# Patient Record
Sex: FEMALE | Race: WHITE | Employment: OTHER | URBAN - METROPOLITAN AREA
[De-identification: names, ages, dates, MRNs, and addresses within clinical notes are randomized per-mention and may not be internally consistent; named-entity substitution may affect disease eponyms.]

---

## 2023-09-23 DIAGNOSIS — N32.81 OAB (OVERACTIVE BLADDER): Primary | ICD-10-CM

## 2023-09-25 PROBLEM — E78.00 HYPERCHOLESTEROLEMIA: Status: ACTIVE | Noted: 2023-09-25

## 2023-09-25 PROBLEM — F32.5 DEPRESSION, MAJOR, IN REMISSION (CMS-HCC): Status: ACTIVE | Noted: 2023-09-25

## 2023-09-25 PROBLEM — N32.81 OAB (OVERACTIVE BLADDER): Status: ACTIVE | Noted: 2023-09-25

## 2023-09-25 PROBLEM — I25.10 CAD (CORONARY ARTERY DISEASE): Status: ACTIVE | Noted: 2023-09-25

## 2023-09-25 RX ORDER — OXYBUTYNIN CHLORIDE 5 MG/1
5 TABLET ORAL 2 TIMES DAILY
Qty: 180 TABLET | Refills: 0 | Status: SHIPPED | OUTPATIENT
Start: 2023-09-25 | End: 2024-02-19

## 2023-10-26 ENCOUNTER — LAB (OUTPATIENT)
Dept: LAB | Facility: LAB | Age: 83
End: 2023-10-26
Payer: MEDICARE

## 2023-10-26 ENCOUNTER — OFFICE VISIT (OUTPATIENT)
Dept: PRIMARY CARE | Facility: CLINIC | Age: 83
End: 2023-10-26
Payer: MEDICARE

## 2023-10-26 VITALS
BODY MASS INDEX: 24.59 KG/M2 | TEMPERATURE: 97.1 F | OXYGEN SATURATION: 95 % | HEART RATE: 77 BPM | WEIGHT: 153 LBS | DIASTOLIC BLOOD PRESSURE: 77 MMHG | SYSTOLIC BLOOD PRESSURE: 139 MMHG | HEIGHT: 66 IN

## 2023-10-26 DIAGNOSIS — F32.5 DEPRESSION, MAJOR, IN REMISSION (CMS-HCC): ICD-10-CM

## 2023-10-26 DIAGNOSIS — E78.00 HYPERCHOLESTEROLEMIA: ICD-10-CM

## 2023-10-26 DIAGNOSIS — Z23 NEED FOR VACCINATION: ICD-10-CM

## 2023-10-26 DIAGNOSIS — I25.10 CORONARY ARTERY DISEASE INVOLVING NATIVE CORONARY ARTERY OF NATIVE HEART WITHOUT ANGINA PECTORIS: ICD-10-CM

## 2023-10-26 DIAGNOSIS — Z00.00 ROUTINE GENERAL MEDICAL EXAMINATION AT HEALTH CARE FACILITY: Primary | ICD-10-CM

## 2023-10-26 PROBLEM — H90.3 SENSORINEURAL HEARING LOSS, BILATERAL: Status: ACTIVE | Noted: 2023-10-26

## 2023-10-26 PROBLEM — I34.1 MITRAL VALVE PROLAPSE: Status: ACTIVE | Noted: 2023-10-26

## 2023-10-26 PROBLEM — H91.90 HEARING LOSS: Status: ACTIVE | Noted: 2023-10-26

## 2023-10-26 PROBLEM — M16.10 HIP ARTHRITIS: Status: ACTIVE | Noted: 2023-10-26

## 2023-10-26 LAB
ALBUMIN SERPL BCP-MCNC: 4.2 G/DL (ref 3.4–5)
ALP SERPL-CCNC: 53 U/L (ref 33–136)
ALT SERPL W P-5'-P-CCNC: 19 U/L (ref 7–45)
ANION GAP SERPL CALC-SCNC: 12 MMOL/L (ref 10–20)
AST SERPL W P-5'-P-CCNC: 21 U/L (ref 9–39)
BILIRUB SERPL-MCNC: 0.9 MG/DL (ref 0–1.2)
BUN SERPL-MCNC: 15 MG/DL (ref 6–23)
CALCIUM SERPL-MCNC: 9.5 MG/DL (ref 8.6–10.3)
CHLORIDE SERPL-SCNC: 103 MMOL/L (ref 98–107)
CHOLEST SERPL-MCNC: 194 MG/DL (ref 0–199)
CHOLESTEROL/HDL RATIO: 2.9
CO2 SERPL-SCNC: 29 MMOL/L (ref 21–32)
CREAT SERPL-MCNC: 0.71 MG/DL (ref 0.5–1.05)
ERYTHROCYTE [DISTWIDTH] IN BLOOD BY AUTOMATED COUNT: 13.2 % (ref 11.5–14.5)
GFR SERPL CREATININE-BSD FRML MDRD: 84 ML/MIN/1.73M*2
GLUCOSE SERPL-MCNC: 102 MG/DL (ref 74–99)
HCT VFR BLD AUTO: 41.5 % (ref 36–46)
HDLC SERPL-MCNC: 67.6 MG/DL
HGB BLD-MCNC: 13.4 G/DL (ref 12–16)
LDLC SERPL CALC-MCNC: 98 MG/DL
MCH RBC QN AUTO: 30.1 PG (ref 26–34)
MCHC RBC AUTO-ENTMCNC: 32.3 G/DL (ref 32–36)
MCV RBC AUTO: 93 FL (ref 80–100)
NON HDL CHOLESTEROL: 126 MG/DL (ref 0–149)
NRBC BLD-RTO: 0 /100 WBCS (ref 0–0)
PLATELET # BLD AUTO: 223 X10*3/UL (ref 150–450)
PMV BLD AUTO: 10.3 FL (ref 7.5–11.5)
POTASSIUM SERPL-SCNC: 4.5 MMOL/L (ref 3.5–5.3)
PROT SERPL-MCNC: 6.2 G/DL (ref 6.4–8.2)
RBC # BLD AUTO: 4.45 X10*6/UL (ref 4–5.2)
SODIUM SERPL-SCNC: 139 MMOL/L (ref 136–145)
TRIGL SERPL-MCNC: 142 MG/DL (ref 0–149)
VLDL: 28 MG/DL (ref 0–40)
WBC # BLD AUTO: 5 X10*3/UL (ref 4.4–11.3)

## 2023-10-26 PROCEDURE — 1170F FXNL STATUS ASSESSED: CPT | Performed by: FAMILY MEDICINE

## 2023-10-26 PROCEDURE — 1036F TOBACCO NON-USER: CPT | Performed by: FAMILY MEDICINE

## 2023-10-26 PROCEDURE — 1160F RVW MEDS BY RX/DR IN RCRD: CPT | Performed by: FAMILY MEDICINE

## 2023-10-26 PROCEDURE — 90662 IIV NO PRSV INCREASED AG IM: CPT | Performed by: FAMILY MEDICINE

## 2023-10-26 PROCEDURE — 36415 COLL VENOUS BLD VENIPUNCTURE: CPT

## 2023-10-26 PROCEDURE — 80053 COMPREHEN METABOLIC PANEL: CPT

## 2023-10-26 PROCEDURE — G0008 ADMIN INFLUENZA VIRUS VAC: HCPCS | Performed by: FAMILY MEDICINE

## 2023-10-26 PROCEDURE — 1126F AMNT PAIN NOTED NONE PRSNT: CPT | Performed by: FAMILY MEDICINE

## 2023-10-26 PROCEDURE — 85027 COMPLETE CBC AUTOMATED: CPT

## 2023-10-26 PROCEDURE — 80061 LIPID PANEL: CPT

## 2023-10-26 PROCEDURE — G0439 PPPS, SUBSEQ VISIT: HCPCS | Performed by: FAMILY MEDICINE

## 2023-10-26 PROCEDURE — 1159F MED LIST DOCD IN RCRD: CPT | Performed by: FAMILY MEDICINE

## 2023-10-26 RX ORDER — MELOXICAM 15 MG/1
1 TABLET ORAL DAILY PRN
COMMUNITY
Start: 2019-05-22

## 2023-10-26 RX ORDER — METOPROLOL SUCCINATE 25 MG/1
1 TABLET, EXTENDED RELEASE ORAL DAILY
COMMUNITY
Start: 2016-05-12 | End: 2024-01-21 | Stop reason: SDUPTHER

## 2023-10-26 RX ORDER — ASPIRIN 81 MG/1
1 TABLET ORAL DAILY
COMMUNITY
Start: 2016-05-12

## 2023-10-26 RX ORDER — FLUOXETINE HYDROCHLORIDE 20 MG/1
1 CAPSULE ORAL DAILY
COMMUNITY
Start: 2021-02-21 | End: 2023-11-01

## 2023-10-26 RX ORDER — AMLODIPINE BESYLATE 5 MG/1
1 TABLET ORAL DAILY
COMMUNITY
Start: 2022-05-03 | End: 2024-04-10

## 2023-10-26 RX ORDER — IRBESARTAN 150 MG/1
1 TABLET ORAL DAILY
COMMUNITY
Start: 2017-06-28 | End: 2024-01-21 | Stop reason: SDUPTHER

## 2023-10-26 RX ORDER — ELECTROLYTES/DEXTROSE
5 SOLUTION, ORAL ORAL DAILY
COMMUNITY
Start: 2022-05-03

## 2023-10-26 RX ORDER — PNV NO.95/FERROUS FUM/FOLIC AC 28MG-0.8MG
1 TABLET ORAL DAILY
COMMUNITY
Start: 2022-10-25

## 2023-10-26 RX ORDER — IBUPROFEN 200 MG
200 TABLET ORAL
COMMUNITY
Start: 2016-05-12

## 2023-10-26 RX ORDER — SIMVASTATIN 20 MG/1
1 TABLET, FILM COATED ORAL EVERY EVENING
COMMUNITY
Start: 2023-05-12 | End: 2024-03-05

## 2023-10-26 SDOH — ECONOMIC STABILITY: FOOD INSECURITY: WITHIN THE PAST 12 MONTHS, YOU WORRIED THAT YOUR FOOD WOULD RUN OUT BEFORE YOU GOT MONEY TO BUY MORE.: NEVER TRUE

## 2023-10-26 SDOH — ECONOMIC STABILITY: FOOD INSECURITY: WITHIN THE PAST 12 MONTHS, THE FOOD YOU BOUGHT JUST DIDN'T LAST AND YOU DIDN'T HAVE MONEY TO GET MORE.: NEVER TRUE

## 2023-10-26 ASSESSMENT — PATIENT HEALTH QUESTIONNAIRE - PHQ9
SUM OF ALL RESPONSES TO PHQ9 QUESTIONS 1 & 2: 0
1. LITTLE INTEREST OR PLEASURE IN DOING THINGS: NOT AT ALL
2. FEELING DOWN, DEPRESSED OR HOPELESS: NOT AT ALL

## 2023-10-26 ASSESSMENT — ACTIVITIES OF DAILY LIVING (ADL)
DOING_HOUSEWORK: INDEPENDENT
GROCERY_SHOPPING: INDEPENDENT
MANAGING_FINANCES: INDEPENDENT
BATHING: INDEPENDENT
DRESSING: INDEPENDENT
TAKING_MEDICATION: INDEPENDENT

## 2023-10-26 ASSESSMENT — LIFESTYLE VARIABLES
HOW MANY STANDARD DRINKS CONTAINING ALCOHOL DO YOU HAVE ON A TYPICAL DAY: 1 OR 2
HOW OFTEN DO YOU HAVE SIX OR MORE DRINKS ON ONE OCCASION: NEVER
HOW OFTEN DO YOU HAVE A DRINK CONTAINING ALCOHOL: MONTHLY OR LESS
SKIP TO QUESTIONS 9-10: 1
AUDIT-C TOTAL SCORE: 1

## 2023-10-26 ASSESSMENT — ENCOUNTER SYMPTOMS
OCCASIONAL FEELINGS OF UNSTEADINESS: 0
LOSS OF SENSATION IN FEET: 0
DEPRESSION: 0

## 2023-10-26 ASSESSMENT — PAIN SCALES - GENERAL: PAINLEVEL: 0-NO PAIN

## 2023-10-26 NOTE — PROGRESS NOTES
"Subjective   Patient ID: Christine Horowitz is a 82 y.o. female who presents for Medicare Annual Wellness Visit Subsequent.  No problems or concerns.         In addition to that documented in the HPI above, the additional ROS was obtained:  Constitutional: Denies fevers or chills  Eyes: Denies vision changes  ENMT: Denies trouble swallowing  Cardiovascular: Denies chest pain or heart racing  Respiratory: Denies SOB or cough  Gastrointestinal: Denies nausea, vomiting, diarrhea or constipation      Current Outpatient Medications   Medication Sig Dispense Refill    amLODIPine (Norvasc) 5 mg tablet Take 1 tablet (5 mg) by mouth once daily.      aspirin 81 mg EC tablet Take 1 tablet (81 mg) by mouth once daily.      biotin 5 mg capsule Take 1 capsule (5 mg) by mouth once daily.      cyanocobalamin (Vitamin B-12) 100 mcg tablet Take 1 tablet (100 mcg) by mouth once daily.      FLUoxetine (PROzac) 20 mg capsule Take 1 capsule (20 mg) by mouth once daily.      irbesartan (Avapro) 150 mg tablet Take 1 tablet (150 mg) by mouth once daily.      meloxicam (Mobic) 15 mg tablet Take 1 tablet (15 mg) by mouth once daily as needed for mild pain (1 - 3) or moderate pain (4 - 6).      metoprolol succinate XL (Toprol-XL) 25 mg 24 hr tablet Take 1 tablet (25 mg) by mouth once daily.      multivitamin with minerals (multivitamin-iron-folic acid) tablet Take 1 tablet by mouth once daily.      oxybutynin (Ditropan) 5 mg tablet TAKE 1 TABLET TWICE DAILY 180 tablet 0    simvastatin (Zocor) 20 mg tablet Take 1 tablet (20 mg) by mouth once daily in the evening.      ibuprofen 200 mg tablet Take 1 tablet (200 mg) by mouth.       No current facility-administered medications for this visit.       Objective     Visit Vitals  /77 (BP Location: Right arm, Patient Position: Sitting, BP Cuff Size: Adult)   Pulse 77   Temp 36.2 °C (97.1 °F) (Temporal)   Ht 1.676 m (5' 6\")   Wt 69.4 kg (153 lb)   SpO2 95%   BMI 24.69 kg/m²   Smoking Status " Never   BSA 1.8 m²        Constitutional: Well nourished, well developed, appears stated age  Eyes: no scleral icterus, no conjunctival injection  Ears: normal external auditory canal, no retraction or bulging of tympanic membranes  Neck: no thyromegaly  Cardiovascular: regular rate and rhythm, no leg edema  Respiratory: normal respiratory effort, clear to auscultation bilaterally  Musculoskeletal: No gross deformities appreciated  Skin: Warm, dry. No rashes  Neurologic: Alert, CNs II-XII grossly intact..  Psych: Appropriate mood and affect.        Assessment/Plan   Problem List Items Addressed This Visit       CAD (coronary artery disease)    Relevant Orders    CBC    Comprehensive Metabolic Panel    Lipid Panel    Depression, major, in remission (CMS/HCC) (Chronic)     Continue Prozac         Hypercholesterolemia    Relevant Orders    Comprehensive Metabolic Panel    Lipid Panel     Other Visit Diagnoses       Routine general medical examination at health care facility    -  Primary    Flu shot given  yearly labs ordered    Need for vaccination        Relevant Orders    Flu vaccine, high dose seasonal, preservative free (Completed)              Grand-daughter Macey is 9.  passed away 8/2020 - he had Parkinson's.    Follow up yearly and prn.    Dacia Hyde DO

## 2023-10-26 NOTE — PATIENT INSTRUCTIONS
Thank you for choosing [facility] for your healthcare.   As always if you have any questions or concerns please do not hesitate to call our office at [phone] or through [employer].    Have a great day!  Dacia Hyde, DO

## 2023-11-01 DIAGNOSIS — F32.5 DEPRESSION, MAJOR, IN REMISSION (CMS-HCC): Primary | Chronic | ICD-10-CM

## 2023-11-01 RX ORDER — FLUOXETINE HYDROCHLORIDE 20 MG/1
20 CAPSULE ORAL DAILY
Qty: 90 CAPSULE | Refills: 3 | Status: SHIPPED | OUTPATIENT
Start: 2023-11-01 | End: 2024-10-31

## 2024-01-13 DIAGNOSIS — I50.9 CONGESTIVE HEART FAILURE, UNSPECIFIED HF CHRONICITY, UNSPECIFIED HEART FAILURE TYPE (MULTI): Primary | ICD-10-CM

## 2024-01-21 RX ORDER — IRBESARTAN 150 MG/1
150 TABLET ORAL DAILY
Qty: 90 TABLET | Refills: 1 | Status: SHIPPED | OUTPATIENT
Start: 2024-01-21 | End: 2024-06-04 | Stop reason: SDUPTHER

## 2024-01-21 RX ORDER — METOPROLOL SUCCINATE 25 MG/1
25 TABLET, EXTENDED RELEASE ORAL DAILY
Qty: 90 TABLET | Refills: 1 | Status: SHIPPED | OUTPATIENT
Start: 2024-01-21 | End: 2024-06-04 | Stop reason: SDUPTHER

## 2024-02-18 DIAGNOSIS — N32.81 OAB (OVERACTIVE BLADDER): ICD-10-CM

## 2024-02-19 RX ORDER — OXYBUTYNIN CHLORIDE 5 MG/1
5 TABLET ORAL 2 TIMES DAILY
Qty: 180 TABLET | Refills: 3 | Status: SHIPPED | OUTPATIENT
Start: 2024-02-19

## 2024-02-28 DIAGNOSIS — E78.00 HYPERCHOLESTEROLEMIA: Primary | ICD-10-CM

## 2024-03-05 RX ORDER — SIMVASTATIN 20 MG/1
20 TABLET, FILM COATED ORAL EVERY EVENING
Qty: 90 TABLET | Refills: 1 | Status: SHIPPED | OUTPATIENT
Start: 2024-03-05 | End: 2024-06-04 | Stop reason: SDUPTHER

## 2024-04-04 DIAGNOSIS — I25.10 CORONARY ARTERY DISEASE INVOLVING NATIVE CORONARY ARTERY OF NATIVE HEART, UNSPECIFIED WHETHER ANGINA PRESENT: Primary | ICD-10-CM

## 2024-04-10 RX ORDER — AMLODIPINE BESYLATE 5 MG/1
5 TABLET ORAL DAILY
Qty: 90 TABLET | Refills: 0 | Status: SHIPPED | OUTPATIENT
Start: 2024-04-10 | End: 2024-06-04 | Stop reason: SDUPTHER

## 2024-05-14 ENCOUNTER — APPOINTMENT (OUTPATIENT)
Dept: CARDIOLOGY | Facility: CLINIC | Age: 84
End: 2024-05-14
Payer: MEDICARE

## 2024-06-04 ENCOUNTER — OFFICE VISIT (OUTPATIENT)
Dept: CARDIOLOGY | Facility: CLINIC | Age: 84
End: 2024-06-04
Payer: MEDICARE

## 2024-06-04 VITALS
BODY MASS INDEX: 24.91 KG/M2 | HEART RATE: 71 BPM | SYSTOLIC BLOOD PRESSURE: 136 MMHG | HEIGHT: 66 IN | WEIGHT: 155 LBS | DIASTOLIC BLOOD PRESSURE: 82 MMHG

## 2024-06-04 DIAGNOSIS — E78.00 HYPERCHOLESTEROLEMIA: ICD-10-CM

## 2024-06-04 DIAGNOSIS — I25.10 CORONARY ARTERY DISEASE INVOLVING NATIVE CORONARY ARTERY OF NATIVE HEART WITHOUT ANGINA PECTORIS: Primary | ICD-10-CM

## 2024-06-04 DIAGNOSIS — I49.8 SINUS ARRHYTHMIA: ICD-10-CM

## 2024-06-04 DIAGNOSIS — I25.10 CORONARY ARTERY DISEASE INVOLVING NATIVE CORONARY ARTERY OF NATIVE HEART, UNSPECIFIED WHETHER ANGINA PRESENT: ICD-10-CM

## 2024-06-04 DIAGNOSIS — I50.9 CONGESTIVE HEART FAILURE, UNSPECIFIED HF CHRONICITY, UNSPECIFIED HEART FAILURE TYPE (MULTI): ICD-10-CM

## 2024-06-04 DIAGNOSIS — I49.3 PVC (PREMATURE VENTRICULAR CONTRACTION): ICD-10-CM

## 2024-06-04 PROCEDURE — 1036F TOBACCO NON-USER: CPT | Performed by: INTERNAL MEDICINE

## 2024-06-04 PROCEDURE — 93000 ELECTROCARDIOGRAM COMPLETE: CPT | Performed by: INTERNAL MEDICINE

## 2024-06-04 PROCEDURE — 99215 OFFICE O/P EST HI 40 MIN: CPT | Performed by: INTERNAL MEDICINE

## 2024-06-04 PROCEDURE — 1159F MED LIST DOCD IN RCRD: CPT | Performed by: INTERNAL MEDICINE

## 2024-06-04 RX ORDER — IRBESARTAN 150 MG/1
150 TABLET ORAL DAILY
Qty: 90 TABLET | Refills: 3 | Status: SHIPPED | OUTPATIENT
Start: 2024-06-04 | End: 2025-06-04

## 2024-06-04 RX ORDER — AMLODIPINE BESYLATE 5 MG/1
5 TABLET ORAL DAILY
Qty: 90 TABLET | Refills: 3 | Status: SHIPPED | OUTPATIENT
Start: 2024-06-04 | End: 2025-06-04

## 2024-06-04 RX ORDER — METOPROLOL SUCCINATE 25 MG/1
25 TABLET, EXTENDED RELEASE ORAL DAILY
Qty: 90 TABLET | Refills: 3 | Status: SHIPPED | OUTPATIENT
Start: 2024-06-04 | End: 2025-06-04

## 2024-06-04 RX ORDER — SIMVASTATIN 20 MG/1
20 TABLET, FILM COATED ORAL EVERY EVENING
Qty: 90 TABLET | Refills: 3 | Status: SHIPPED | OUTPATIENT
Start: 2024-06-04 | End: 2025-06-04

## 2024-06-04 NOTE — PATIENT INSTRUCTIONS
Thanks for following up in office today.    1)  I have refilled your medications.  Metoprolol, irbesartan, simvastatin, and amlodipine.    2) Monitor your symptoms and let us know if you notice an irregular heart beat/rhythm.     3)  Please continue your cardiac medications as prescribed.    Follow up with CHRIS Garcia NP in 6 months  If you have any questions, please call [phone] and choose option for Dr. Centeno's nurse Lisa Morgan

## 2024-06-04 NOTE — PROGRESS NOTES
"Chief Complaint:   No chief complaint on file.     History Of Present Illness:    Christine Horowitz is a 83 y.o. female presenting for yearly follow up.   a history of hypertension and mitral valve prolapse and mild CAD by cardiac CTA in 2013.     From a cardiac standpoint she has done well.  No chest pain/pressure, SOB, FREY, LE edema.  No palps, dizziness, syncope.    Compliant with her meds as prescribed.  Citing some situational sad mood / depression today but denying any SI/HI.    ROS:  The remainder of the review of systems was obtained, as was negative as pertains to the chief complaint.    CV testing   Lipid labs 10/2023  chol 194, HDL 67.6  LDL 98  trig 142  CMP   K 4.5  BUN 15  crea 0.71 alt 19  ast 21  H&H 13.4  41.5    Last Recorded Vitals:  Vitals:    06/04/24 1001   BP: 136/82   Pulse: 71   Weight: 70.3 kg (155 lb)   Height: 1.676 m (5' 6\")       Past Medical History:  She has a past medical history of Personal history of other diseases of the digestive system and Personal history of other diseases of the nervous system and sense organs (12/15/2021).    Past Surgical History:  She has a past surgical history that includes Hysterectomy (05/12/2016) and Bladder surgery (05/12/2016).      Social History:  She reports that she has never smoked. She has never used smokeless tobacco. She reports current alcohol use of about 1.0 standard drink of alcohol per week. No history on file for drug use.    Family History:  Family History   Problem Relation Name Age of Onset    No Known Problems Mother      No Known Problems Father          Allergies:  Lisinopril    Outpatient Medications:  Current Outpatient Medications   Medication Instructions    amLODIPine (NORVASC) 5 mg, oral, Daily    aspirin 81 mg EC tablet 1 tablet, oral, Daily    biotin 5 mg, oral, Daily    cyanocobalamin (Vitamin B-12) 100 mcg tablet 1 tablet, oral, Daily    FLUoxetine (PROZAC) 20 mg, oral, Daily    ibuprofen 200 mg, oral    irbesartan " (AVAPRO) 150 mg, oral, Daily    meloxicam (Mobic) 15 mg tablet 1 tablet, oral, Daily PRN    metoprolol succinate XL (TOPROL-XL) 25 mg, oral, Daily    multivitamin with minerals (multivitamin-iron-folic acid) tablet 1 tablet, oral, Daily    oxybutynin (DITROPAN) 5 mg, oral, 2 times daily    simvastatin (ZOCOR) 20 mg, oral, Every evening       Physical Exam:  Physical Exam  HENT:      Head: Normocephalic.      Nose: Nose normal.      Mouth/Throat:      Mouth: Mucous membranes are moist.   Cardiovascular:      Rate and Rhythm: Normal rate and regular rhythm.      Heart sounds: S1 normal and S2 normal.      Comments: No carotid bruits  Occasional premature heart beats.  Pulmonary:      Effort: Pulmonary effort is normal.      Breath sounds: Normal breath sounds.   Abdominal:      Palpations: Abdomen is soft.   Musculoskeletal:         General: Normal range of motion.      Cervical back: Normal range of motion.   Skin:     General: Skin is warm and dry.   Neurological:      General: No focal deficit present.      Mental Status: She is alert.   Psychiatric:         Mood and Affect: Mood normal.            Last Labs:  CBC -  Lab Results   Component Value Date    WBC 5.0 10/26/2023    HGB 13.4 10/26/2023    HCT 41.5 10/26/2023    MCV 93 10/26/2023     10/26/2023       CMP -  Lab Results   Component Value Date    CALCIUM 9.5 10/26/2023    PROT 6.2 (L) 10/26/2023    ALBUMIN 4.2 10/26/2023    AST 21 10/26/2023    ALT 19 10/26/2023    ALKPHOS 53 10/26/2023    BILITOT 0.9 10/26/2023       LIPID PANEL -   Lab Results   Component Value Date    CHOL 194 10/26/2023    TRIG 142 10/26/2023    HDL 67.6 10/26/2023    CHHDL 2.9 10/26/2023    LDLF 119 (H) 11/29/2022    VLDL 28 10/26/2023    NHDL 126 10/26/2023       RENAL FUNCTION PANEL -   Lab Results   Component Value Date    GLUCOSE 102 (H) 10/26/2023     10/26/2023    K 4.5 10/26/2023     10/26/2023    CO2 29 10/26/2023    ANIONGAP 12 10/26/2023    BUN 15 10/26/2023  "   CREATININE 0.71 10/26/2023    CALCIUM 9.5 10/26/2023    ALBUMIN 4.2 10/26/2023        No results found for: \"BNP\", \"HGBA1C\"    Cardiac Imaging:  No results found for this or any previous visit from the past 1095 days.      Assessment/Plan   Very pleasant 81yo white female with a history of hypertension and mitral valve prolapse and mild CAD by cardiac CTA in 2013.   NO HF symptoms, no angina.  Euvolemic on exam today.  Only significant finding is sinus arrhythmia and PVC's on EKG, pt on BB, and she is asymptomatic.  Pt declining rhythm monitor.     CAD: mild CAD by CT calcium score in 2013. She denies any angina and remains physically active.  -continue ASA  -last FLP 10/23   LD 98  -continue statin  -should have rpt FLP, AST/ALT later this year     HTN: reviewed her BP trend, elevated as high at 150/90's  -amlodipine 5mg daily  -continue irbesartan 150mg daily  -continue metoprolol succinate 25mg daily     MV prolapse: no HF sx. last TTE in 2013 with trace MR  -serial monitoring clinically  -will repeat an echo if she has symptoms      "

## 2024-07-17 ENCOUNTER — APPOINTMENT (OUTPATIENT)
Dept: CARDIOLOGY | Facility: HOSPITAL | Age: 84
End: 2024-07-17
Payer: MEDICARE

## 2024-07-17 ENCOUNTER — APPOINTMENT (OUTPATIENT)
Dept: RADIOLOGY | Facility: HOSPITAL | Age: 84
End: 2024-07-17
Payer: MEDICARE

## 2024-07-17 ENCOUNTER — HOSPITAL ENCOUNTER (INPATIENT)
Facility: HOSPITAL | Age: 84
LOS: 2 days | Discharge: HOME | End: 2024-07-19
Attending: STUDENT IN AN ORGANIZED HEALTH CARE EDUCATION/TRAINING PROGRAM | Admitting: INTERNAL MEDICINE
Payer: MEDICARE

## 2024-07-17 DIAGNOSIS — R42 DIZZINESS AND GIDDINESS: ICD-10-CM

## 2024-07-17 DIAGNOSIS — Q21.12 PFO (PATENT FORAMEN OVALE) (HHS-HCC): ICD-10-CM

## 2024-07-17 DIAGNOSIS — I63.9 CEREBROVASCULAR ACCIDENT (CVA), UNSPECIFIED MECHANISM (MULTI): Primary | ICD-10-CM

## 2024-07-17 PROBLEM — I10 PRIMARY HYPERTENSION: Status: ACTIVE | Noted: 2024-07-17

## 2024-07-17 LAB
ALBUMIN SERPL BCP-MCNC: 4.2 G/DL (ref 3.4–5)
ALP SERPL-CCNC: 48 U/L (ref 33–136)
ALT SERPL W P-5'-P-CCNC: 17 U/L (ref 7–45)
ANION GAP SERPL CALC-SCNC: 18 MMOL/L (ref 10–20)
APTT PPP: 24 SECONDS (ref 27–38)
AST SERPL W P-5'-P-CCNC: 25 U/L (ref 9–39)
BASOPHILS # BLD AUTO: 0.03 X10*3/UL (ref 0–0.1)
BASOPHILS NFR BLD AUTO: 0.4 %
BILIRUB SERPL-MCNC: 0.8 MG/DL (ref 0–1.2)
BNP SERPL-MCNC: 210 PG/ML (ref 0–99)
BUN SERPL-MCNC: 18 MG/DL (ref 6–23)
CALCIUM SERPL-MCNC: 9.4 MG/DL (ref 8.6–10.3)
CARDIAC TROPONIN I PNL SERPL HS: 10 NG/L (ref 0–13)
CHLORIDE SERPL-SCNC: 102 MMOL/L (ref 98–107)
CHOLEST SERPL-MCNC: 195 MG/DL (ref 0–199)
CHOLESTEROL/HDL RATIO: 3.2
CO2 SERPL-SCNC: 19 MMOL/L (ref 21–32)
CREAT SERPL-MCNC: 0.93 MG/DL (ref 0.5–1.05)
EGFRCR SERPLBLD CKD-EPI 2021: 61 ML/MIN/1.73M*2
EOSINOPHIL # BLD AUTO: 0.09 X10*3/UL (ref 0–0.4)
EOSINOPHIL NFR BLD AUTO: 1.2 %
ERYTHROCYTE [DISTWIDTH] IN BLOOD BY AUTOMATED COUNT: 12.9 % (ref 11.5–14.5)
GLUCOSE BLD MANUAL STRIP-MCNC: 112 MG/DL (ref 74–99)
GLUCOSE BLD MANUAL STRIP-MCNC: 133 MG/DL (ref 74–99)
GLUCOSE SERPL-MCNC: 122 MG/DL (ref 74–99)
HCT VFR BLD AUTO: 39.2 % (ref 36–46)
HDLC SERPL-MCNC: 61.1 MG/DL
HGB BLD-MCNC: 13.9 G/DL (ref 12–16)
IMM GRANULOCYTES # BLD AUTO: 0.02 X10*3/UL (ref 0–0.5)
IMM GRANULOCYTES NFR BLD AUTO: 0.3 % (ref 0–0.9)
INR PPP: 1 (ref 0.9–1.1)
LDLC SERPL CALC-MCNC: 98 MG/DL
LYMPHOCYTES # BLD AUTO: 2.74 X10*3/UL (ref 0.8–3)
LYMPHOCYTES NFR BLD AUTO: 36.1 %
MCH RBC QN AUTO: 31.2 PG (ref 26–34)
MCHC RBC AUTO-ENTMCNC: 35.5 G/DL (ref 32–36)
MCV RBC AUTO: 88 FL (ref 80–100)
MONOCYTES # BLD AUTO: 0.63 X10*3/UL (ref 0.05–0.8)
MONOCYTES NFR BLD AUTO: 8.3 %
NEUTROPHILS # BLD AUTO: 4.09 X10*3/UL (ref 1.6–5.5)
NEUTROPHILS NFR BLD AUTO: 53.7 %
NON HDL CHOLESTEROL: 134 MG/DL (ref 0–149)
NRBC BLD-RTO: 0 /100 WBCS (ref 0–0)
PLATELET # BLD AUTO: 226 X10*3/UL (ref 150–450)
POTASSIUM SERPL-SCNC: 3.6 MMOL/L (ref 3.5–5.3)
PROT SERPL-MCNC: 6.9 G/DL (ref 6.4–8.2)
PROTHROMBIN TIME: 11.2 SECONDS (ref 9.8–12.8)
RBC # BLD AUTO: 4.46 X10*6/UL (ref 4–5.2)
SODIUM SERPL-SCNC: 135 MMOL/L (ref 136–145)
TRIGL SERPL-MCNC: 179 MG/DL (ref 0–149)
VLDL: 36 MG/DL (ref 0–40)
WBC # BLD AUTO: 7.6 X10*3/UL (ref 4.4–11.3)

## 2024-07-17 PROCEDURE — 2500000001 HC RX 250 WO HCPCS SELF ADMINISTERED DRUGS (ALT 637 FOR MEDICARE OP): Performed by: NURSE PRACTITIONER

## 2024-07-17 PROCEDURE — 80053 COMPREHEN METABOLIC PANEL: CPT | Performed by: STUDENT IN AN ORGANIZED HEALTH CARE EDUCATION/TRAINING PROGRAM

## 2024-07-17 PROCEDURE — 83880 ASSAY OF NATRIURETIC PEPTIDE: CPT | Performed by: NURSE PRACTITIONER

## 2024-07-17 PROCEDURE — 99291 CRITICAL CARE FIRST HOUR: CPT | Performed by: STUDENT IN AN ORGANIZED HEALTH CARE EDUCATION/TRAINING PROGRAM

## 2024-07-17 PROCEDURE — 70496 CT ANGIOGRAPHY HEAD: CPT | Performed by: RADIOLOGY

## 2024-07-17 PROCEDURE — 2550000001 HC RX 255 CONTRASTS: Performed by: STUDENT IN AN ORGANIZED HEALTH CARE EDUCATION/TRAINING PROGRAM

## 2024-07-17 PROCEDURE — 3E03317 INTRODUCTION OF OTHER THROMBOLYTIC INTO PERIPHERAL VEIN, PERCUTANEOUS APPROACH: ICD-10-PCS | Performed by: STUDENT IN AN ORGANIZED HEALTH CARE EDUCATION/TRAINING PROGRAM

## 2024-07-17 PROCEDURE — 70498 CT ANGIOGRAPHY NECK: CPT | Performed by: RADIOLOGY

## 2024-07-17 PROCEDURE — 70450 CT HEAD/BRAIN W/O DYE: CPT

## 2024-07-17 PROCEDURE — 99223 1ST HOSP IP/OBS HIGH 75: CPT | Performed by: NURSE PRACTITIONER

## 2024-07-17 PROCEDURE — 2020000001 HC ICU ROOM DAILY

## 2024-07-17 PROCEDURE — 70496 CT ANGIOGRAPHY HEAD: CPT

## 2024-07-17 PROCEDURE — 96375 TX/PRO/DX INJ NEW DRUG ADDON: CPT

## 2024-07-17 PROCEDURE — 82947 ASSAY GLUCOSE BLOOD QUANT: CPT

## 2024-07-17 PROCEDURE — 2500000004 HC RX 250 GENERAL PHARMACY W/ HCPCS (ALT 636 FOR OP/ED): Performed by: STUDENT IN AN ORGANIZED HEALTH CARE EDUCATION/TRAINING PROGRAM

## 2024-07-17 PROCEDURE — 93005 ELECTROCARDIOGRAM TRACING: CPT

## 2024-07-17 PROCEDURE — 99223 1ST HOSP IP/OBS HIGH 75: CPT | Performed by: PSYCHIATRY & NEUROLOGY

## 2024-07-17 PROCEDURE — 84484 ASSAY OF TROPONIN QUANT: CPT | Performed by: STUDENT IN AN ORGANIZED HEALTH CARE EDUCATION/TRAINING PROGRAM

## 2024-07-17 PROCEDURE — 83036 HEMOGLOBIN GLYCOSYLATED A1C: CPT | Performed by: NURSE PRACTITIONER

## 2024-07-17 PROCEDURE — 36415 COLL VENOUS BLD VENIPUNCTURE: CPT | Performed by: NURSE PRACTITIONER

## 2024-07-17 PROCEDURE — 2500000004 HC RX 250 GENERAL PHARMACY W/ HCPCS (ALT 636 FOR OP/ED)

## 2024-07-17 PROCEDURE — 85025 COMPLETE CBC W/AUTO DIFF WBC: CPT | Performed by: STUDENT IN AN ORGANIZED HEALTH CARE EDUCATION/TRAINING PROGRAM

## 2024-07-17 PROCEDURE — 85730 THROMBOPLASTIN TIME PARTIAL: CPT | Performed by: STUDENT IN AN ORGANIZED HEALTH CARE EDUCATION/TRAINING PROGRAM

## 2024-07-17 PROCEDURE — 36415 COLL VENOUS BLD VENIPUNCTURE: CPT | Performed by: STUDENT IN AN ORGANIZED HEALTH CARE EDUCATION/TRAINING PROGRAM

## 2024-07-17 PROCEDURE — 2500000002 HC RX 250 W HCPCS SELF ADMINISTERED DRUGS (ALT 637 FOR MEDICARE OP, ALT 636 FOR OP/ED): Performed by: NURSE PRACTITIONER

## 2024-07-17 PROCEDURE — 99291 CRITICAL CARE FIRST HOUR: CPT

## 2024-07-17 PROCEDURE — 85610 PROTHROMBIN TIME: CPT | Performed by: STUDENT IN AN ORGANIZED HEALTH CARE EDUCATION/TRAINING PROGRAM

## 2024-07-17 PROCEDURE — 96374 THER/PROPH/DIAG INJ IV PUSH: CPT

## 2024-07-17 PROCEDURE — 80061 LIPID PANEL: CPT | Performed by: NURSE PRACTITIONER

## 2024-07-17 RX ORDER — POLYETHYLENE GLYCOL 3350 17 G/17G
17 POWDER, FOR SOLUTION ORAL DAILY
Status: DISCONTINUED | OUTPATIENT
Start: 2024-07-17 | End: 2024-07-19 | Stop reason: HOSPADM

## 2024-07-17 RX ORDER — ATORVASTATIN CALCIUM 40 MG/1
80 TABLET, FILM COATED ORAL NIGHTLY
Status: DISCONTINUED | OUTPATIENT
Start: 2024-07-17 | End: 2024-07-19 | Stop reason: HOSPADM

## 2024-07-17 RX ORDER — LABETALOL HYDROCHLORIDE 5 MG/ML
10 INJECTION, SOLUTION INTRAVENOUS EVERY 10 MIN PRN
Status: DISCONTINUED | OUTPATIENT
Start: 2024-07-17 | End: 2024-07-19 | Stop reason: HOSPADM

## 2024-07-17 RX ORDER — OXYBUTYNIN CHLORIDE 5 MG/1
5 TABLET ORAL 2 TIMES DAILY
Status: DISCONTINUED | OUTPATIENT
Start: 2024-07-17 | End: 2024-07-19 | Stop reason: HOSPADM

## 2024-07-17 RX ORDER — HYDRALAZINE HYDROCHLORIDE 20 MG/ML
10 INJECTION INTRAMUSCULAR; INTRAVENOUS
Status: DISCONTINUED | OUTPATIENT
Start: 2024-07-17 | End: 2024-07-19 | Stop reason: HOSPADM

## 2024-07-17 RX ORDER — METOCLOPRAMIDE HYDROCHLORIDE 5 MG/ML
10 INJECTION INTRAMUSCULAR; INTRAVENOUS ONCE
Status: COMPLETED | OUTPATIENT
Start: 2024-07-17 | End: 2024-07-17

## 2024-07-17 RX ORDER — ENOXAPARIN SODIUM 100 MG/ML
40 INJECTION SUBCUTANEOUS EVERY 24 HOURS
Status: DISCONTINUED | OUTPATIENT
Start: 2024-07-18 | End: 2024-07-19 | Stop reason: HOSPADM

## 2024-07-17 RX ORDER — METOCLOPRAMIDE HYDROCHLORIDE 5 MG/ML
INJECTION INTRAMUSCULAR; INTRAVENOUS
Status: COMPLETED
Start: 2024-07-17 | End: 2024-07-17

## 2024-07-17 RX ORDER — DIPHENHYDRAMINE HYDROCHLORIDE 50 MG/ML
25 INJECTION INTRAMUSCULAR; INTRAVENOUS ONCE
Status: COMPLETED | OUTPATIENT
Start: 2024-07-17 | End: 2024-07-17

## 2024-07-17 RX ORDER — HYDRALAZINE HYDROCHLORIDE 25 MG/1
25 TABLET, FILM COATED ORAL EVERY 6 HOURS PRN
Status: DISCONTINUED | OUTPATIENT
Start: 2024-07-19 | End: 2024-07-19 | Stop reason: HOSPADM

## 2024-07-17 RX ORDER — ASPIRIN 81 MG/1
81 TABLET ORAL DAILY
Status: DISCONTINUED | OUTPATIENT
Start: 2024-07-18 | End: 2024-07-18

## 2024-07-17 RX ORDER — FLUOXETINE HYDROCHLORIDE 20 MG/1
20 CAPSULE ORAL DAILY
Status: DISCONTINUED | OUTPATIENT
Start: 2024-07-18 | End: 2024-07-19 | Stop reason: HOSPADM

## 2024-07-17 SDOH — SOCIAL STABILITY: SOCIAL INSECURITY: HAVE YOU HAD THOUGHTS OF HARMING ANYONE ELSE?: NO

## 2024-07-17 ASSESSMENT — ENCOUNTER SYMPTOMS
CHILLS: 0
APPETITE CHANGE: 0
CHEST TIGHTNESS: 0
ABDOMINAL DISTENTION: 0
SORE THROAT: 0
ABDOMINAL PAIN: 0
SINUS PAIN: 0
COUGH: 0
HEADACHES: 0
EYE ITCHING: 0
FATIGUE: 0
PALPITATIONS: 0
WEAKNESS: 0
DIFFICULTY URINATING: 0
SHORTNESS OF BREATH: 0
DIZZINESS: 1
COLOR CHANGE: 0
HALLUCINATIONS: 0
BLOOD IN STOOL: 0
EYE DISCHARGE: 0
LIGHT-HEADEDNESS: 0
BACK PAIN: 0
ADENOPATHY: 0
PHOTOPHOBIA: 0
CHOKING: 0
SEIZURES: 0
NERVOUS/ANXIOUS: 0
NECK PAIN: 0
CONFUSION: 0
EYE PAIN: 0
UNEXPECTED WEIGHT CHANGE: 0
VOMITING: 0
SLEEP DISTURBANCE: 0
TROUBLE SWALLOWING: 0
DIARRHEA: 0
DYSURIA: 0
VOICE CHANGE: 0
FEVER: 0
POLYPHAGIA: 0
CONSTIPATION: 0
BRUISES/BLEEDS EASILY: 0
FLANK PAIN: 0
TREMORS: 0
NUMBNESS: 0
DYSPHORIC MOOD: 0
POLYDIPSIA: 0
MYALGIAS: 0
NAUSEA: 1
NECK STIFFNESS: 0
ARTHRALGIAS: 0
HEMATURIA: 0
WHEEZING: 0
APNEA: 0
WOUND: 0
FREQUENCY: 0
SPEECH DIFFICULTY: 0

## 2024-07-17 ASSESSMENT — PAIN - FUNCTIONAL ASSESSMENT
PAIN_FUNCTIONAL_ASSESSMENT: 0-10

## 2024-07-17 ASSESSMENT — ACTIVITIES OF DAILY LIVING (ADL)
HEARING - LEFT EAR: FUNCTIONAL
DRESSING YOURSELF: INDEPENDENT
TOILETING: INDEPENDENT
LACK_OF_TRANSPORTATION: NO
ADEQUATE_TO_COMPLETE_ADL: YES
BATHING: INDEPENDENT
WALKS IN HOME: INDEPENDENT
HEARING - RIGHT EAR: FUNCTIONAL
GROOMING: INDEPENDENT
JUDGMENT_ADEQUATE_SAFELY_COMPLETE_DAILY_ACTIVITIES: YES
PATIENT'S MEMORY ADEQUATE TO SAFELY COMPLETE DAILY ACTIVITIES?: YES
FEEDING YOURSELF: INDEPENDENT

## 2024-07-17 ASSESSMENT — COGNITIVE AND FUNCTIONAL STATUS - GENERAL
DAILY ACTIVITIY SCORE: 24
PATIENT BASELINE BEDBOUND: NO
MOBILITY SCORE: 24

## 2024-07-17 ASSESSMENT — PATIENT HEALTH QUESTIONNAIRE - PHQ9
1. LITTLE INTEREST OR PLEASURE IN DOING THINGS: NOT AT ALL
SUM OF ALL RESPONSES TO PHQ9 QUESTIONS 1 & 2: 0
2. FEELING DOWN, DEPRESSED OR HOPELESS: NOT AT ALL

## 2024-07-17 ASSESSMENT — COLUMBIA-SUICIDE SEVERITY RATING SCALE - C-SSRS
6. HAVE YOU EVER DONE ANYTHING, STARTED TO DO ANYTHING, OR PREPARED TO DO ANYTHING TO END YOUR LIFE?: NO
2. HAVE YOU ACTUALLY HAD ANY THOUGHTS OF KILLING YOURSELF?: NO
1. IN THE PAST MONTH, HAVE YOU WISHED YOU WERE DEAD OR WISHED YOU COULD GO TO SLEEP AND NOT WAKE UP?: NO

## 2024-07-17 ASSESSMENT — VISUAL ACUITY: VA_NORMAL: 1

## 2024-07-17 ASSESSMENT — LIFESTYLE VARIABLES
HOW OFTEN DO YOU HAVE A DRINK CONTAINING ALCOHOL: NEVER
PRESCIPTION_ABUSE_PAST_12_MONTHS: NO
HOW MANY STANDARD DRINKS CONTAINING ALCOHOL DO YOU HAVE ON A TYPICAL DAY: PATIENT DOES NOT DRINK
SUBSTANCE_ABUSE_PAST_12_MONTHS: NO
EVER HAD A DRINK FIRST THING IN THE MORNING TO STEADY YOUR NERVES TO GET RID OF A HANGOVER: NO
HOW OFTEN DO YOU HAVE 6 OR MORE DRINKS ON ONE OCCASION: NEVER
EVER FELT BAD OR GUILTY ABOUT YOUR DRINKING: NO
AUDIT-C TOTAL SCORE: 0
AUDIT-C TOTAL SCORE: 0
TOTAL SCORE: 0
SKIP TO QUESTIONS 9-10: 1
HAVE YOU EVER FELT YOU SHOULD CUT DOWN ON YOUR DRINKING: NO
HAVE PEOPLE ANNOYED YOU BY CRITICIZING YOUR DRINKING: NO

## 2024-07-17 ASSESSMENT — PAIN SCALES - GENERAL
PAINLEVEL_OUTOF10: 0 - NO PAIN

## 2024-07-17 NOTE — ED PROVIDER NOTES
HPI   Chief Complaint   Patient presents with    dizzy/nausea 30 min       History/Exam limitations: actively vomiting on exam.   Additional history was obtained from patient.    HPI:       Christine Horowitz is a 83 y.o. with a history of Hypertension, hyperlipidemia, coronary artery disease, she is presenting to the emergency department today for dizziness.  Dizziness started about 30 minutes prior to arrival.  She described it as a room spinning sensation, clockwise, associated with nausea and multiple episodes of nonbloody nonbilious emesis.  She was sitting down when the symptoms occur.  The symptoms are both at rest as well as when she gets up to move.  No alleviating or aggravating factors.  She has never had symptoms like this previously.       Last Known Well Time: within the last hour        ------------------------------------------------------------------------------------------------------------------------------------------     Physical Exam:    ED Triage Vitals [07/17/24 1452]  Temperature: 36.7 °C (98.1 °F)  Pulse: n/a  Respirations: 20  BP: n/a  Pulse Ox: 100 %  Temp Source: Temporal  Heart Rate Source: Monitor  Patient Position: Sitting  BP Location: Left arm  FiO2 (%): n/a     Gen: Not in acute distress  Head/Neck: NCAT  Eyes: Anicteric sclerae, noninjected conjunctivae  Nose: No rhinorrhea  Mouth:  MMM  Heart: Regular rate and rhythm w/ no murmurs, rubs, gallops  Lungs: CTAB w/o wheezes, rales, rhonchi, no increased work of breathing  Abdomen: Soft, NT/ND  Musculoskeletal: No deformities  Extremities: No edema.  Neurologic: Please see below for NIHSS  Skin: No rashes noted  Psychological: Calm        Interval: Baseline  Time: 3:12 PM  Person Administering Scale: Wilberto A Sekoulopoulos, MD     1a  Level of consciousness: 0=alert; keenly responsive  1b. LOC questions:  0=Performs both tasks correctly  1c. LOC commands: 0=Performs both tasks correctly  2.  Best Gaze: 0=normal  3. Visual: 0=No  visual loss  4. Facial Palsy: 0=Normal symmetric movement  5a. Motor left arm: 0=No drift, limb holds 90 (or 45) degrees for full 10 seconds  5b.  Motor right arm: 0=No drift, limb holds 90 (or 45) degrees for full 10 seconds  6a. motor left le=No drift, limb holds 90 (or 45) degrees for full 10 seconds  6b  Motor right le=No drift, limb holds 90 (or 45) degrees for full 10 seconds  7. Limb Ataxia: 0=Absent  8.  Sensory: 0=Normal; no sensory loss  9. Best Language:  0=No aphasia, normal  10. Dysarthria: 0=Normal  11. Extinction and Inattention: 0=No abnormality    Total:   0     She has bilateral nonexhaustable bilateral nystagmus with rotary nystagmus.  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  Patient has bilateral when sitting up she has truncal ataxia.     VAN: VAN: Negative     ------------------------------------------------------------------------------------------------------------------------------------------     Medical Decision Making: Patient presents to the emergency department for dizziness nausea and vomiting.  Based on my examination I am concerned about a potential stroke especially given the bilateral and exhaustive old nystagmus and rotary nystagmus with positive test of skew and truncal ataxia on examination.  Stroke alert was immediately activated the patient was taken over for CT head which on my interpretation along with radiology's did not demonstrate any acute pathology.  I spoke with the patient as well as Dr. Hernandez, [place] [facility] [place], regarding my concerns about the patient being a potential TNK candidate and my leading diagnosis at this time being stroke although the rest of my differential includes but is not limited to peripheral cause of the patient's vertigo.  Dr. Hernandez is agreeable that given the patient's symptoms are debilitating, that TNK can be considered in this case.  I discussed the risks and benefits of TNK with the patient.  All questions were answered.  The patient  verbally consented to this and TNK was given.  She was also given symptom control with Reglan and Benadryl.  Upon reevaluation of the patient her dizziness is improving.  We will admit the patient to the intensive care unit for further monitoring.  [place] neurologist was also notified.  Patient was admitted in otherwise stable condition.          EKG interpreted by myself: Sinus rhythm with a ventricular rate of 72, normal axis, prolonged MO interval with normal QRS and QTc interval, PVC noted, no evidence of an acute STEMI, some ST depressions noted in the lateral leads however.     Independent Interpretation of Studies: I independently interpreted the CT head and see No obvious evidence of intracranial hemorrhage and No obvious evidence of skull fracture    Chronic Medical Conditions Significantly Affecting Care: None    Social Determinants of Health Significantly Affecting Care: None     External Records Reviewed: I reviewed recent and relevant outside records including: None     Discussion of Management with Other Providers:   I discussed the patient/results with: [facility] [place] neurology and the admitting team      IV Thrombolysis IV Thrombolysis Checklist        IV Thrombolysis Given: Yes Thrombolysis Administration: administration time 15:42 Patient meets inclusion and exclusion criteria with expected benefits exceeding the risks of IV thrombolysis therapy or withholding therapy. Patient/ family understand potential risks & benefits and consent to IV Thrombolysis. Discussed the diagnosis of suspected ischemic stroke and options for treatment, including alternative treatments. For patients meeting inclusion and exclusion criteria, the expected benefits exceed the risks of IV thrombolysis therapy or withholding therapy. The main risk of IV thrombolysis therapy is bleeding into the brain or body that may require blood transfusions or lead to death. In clinical studies, the rate of death was not higher in  "patients who received IV thrombolysis compared to those who did not. Other risks include allergic reactions, and with any procedure there is always the possibility of an unexpected complication.    Were there delays to thrombolysis administration?: Yes other Patient not immediately activated as a stroke prior to my evaluation of the patient        Procedure  [unfilled]                                   Hull Coma Scale Score: 15         NIH Stroke Scale: 0          Patient History   Past Medical History:   Diagnosis Date    Personal history of other diseases of the digestive system     History of IBS    Personal history of other diseases of the nervous system and sense organs 12/15/2021    History of acute otitis externa     Past Surgical History:   Procedure Laterality Date    BLADDER SURGERY  05/12/2016    Bladder Surgery    HYSTERECTOMY  05/12/2016    Hysterectomy     Family History   Problem Relation Name Age of Onset    No Known Problems Mother      No Known Problems Father       Social History     Tobacco Use    Smoking status: Never    Smokeless tobacco: Never   Substance Use Topics    Alcohol use: Yes     Alcohol/week: 1.0 standard drink of alcohol     Types: 1 Glasses of wine per week    Drug use: Not on file       Physical Exam   Visit Vitals  /86   Temp 36.7 °C (98.1 °F) (Temporal)   Resp 20   Ht 1.676 m (5' 6\")   Wt 78.4 kg (172 lb 13.5 oz)   SpO2 100%   BMI 27.90 kg/m²   OB Status Unknown   Smoking Status Never   BSA 1.91 m²      Physical Exam    CT brain attack head wo IV contrast   Final Result   No evidence of acute cortical infarct or intracranial hemorrhage.        No evidence of intracranial hemorrhage or displaced skull fracture.        MACRO:   Marianne Shields discussed the significance and urgency of this   critical finding by telephone with  CACHORRO RUIZ on   7/17/2024 at 3:29 pm.  (**-RCF-**) Findings:  See findings.             Signed by: Marianne Shields 7/17/2024 3:29 PM "   Dictation workstation:   [id number]          Labs Reviewed   COMPREHENSIVE METABOLIC PANEL - Abnormal       Result Value    Glucose 122 (*)     Sodium 135 (*)     Potassium 3.6      Chloride 102      Bicarbonate 19 (*)     Anion Gap 18      Urea Nitrogen 18      Creatinine 0.93      eGFR 61      Calcium 9.4      Albumin 4.2      Alkaline Phosphatase 48      Total Protein 6.9      AST 25      Bilirubin, Total 0.8      ALT 17     APTT - Abnormal    aPTT 24 (*)     Narrative:     The APTT is no longer used for monitoring Unfractionated Heparin Therapy. For monitoring Heparin Therapy, use the Heparin Assay.   POCT GLUCOSE - Abnormal    POCT Glucose 133 (*)    TROPONIN I, HIGH SENSITIVITY - Normal    Troponin I, High Sensitivity 10      Narrative:     Less than 99th percentile of normal range cutoff-  Female and children under 18 years old <14 ng/L; Male <21 ng/L: Negative  Repeat testing should be performed if clinically indicated.     Female and children under 18 years old 14-50 ng/L; Male 21-50 ng/L:  Consistent with possible cardiac damage and possible increased clinical   risk. Serial measurements may help to assess extent of myocardial damage.     >50 ng/L: Consistent with cardiac damage, increased clinical risk and  myocardial infarction. Serial measurements may help assess extent of   myocardial damage.      NOTE: Children less than 1 year old may have higher baseline troponin   levels and results should be interpreted in conjunction with the overall   clinical context.     NOTE: Troponin I testing is performed using a different   testing methodology at [facility] than at other   [facility]. Direct result comparisons should only   be made within the same method.   PROTIME-INR - Normal    Protime 11.2      INR 1.0     CBC WITH AUTO DIFFERENTIAL    WBC 7.6      nRBC 0.0      RBC 4.46      Hemoglobin 13.9      Hematocrit 39.2      MCV 88      MCH 31.2      MCHC 35.5      RDW 12.9       Platelets 226      Neutrophils % 53.7      Immature Granulocytes %, Automated 0.3      Lymphocytes % 36.1      Monocytes % 8.3      Eosinophils % 1.2      Basophils % 0.4      Neutrophils Absolute 4.09      Immature Granulocytes Absolute, Automated 0.02      Lymphocytes Absolute 2.74      Monocytes Absolute 0.63      Eosinophils Absolute 0.09      Basophils Absolute 0.03     POCT GLUCOSE METER         ED Course & MDM   Diagnoses as of 07/17/24 1642   Cerebrovascular accident (CVA), unspecified mechanism (Multi)           Medical Decision Making         Your medication list        ASK your doctor about these medications        Instructions Last Dose Given Next Dose Due   amLODIPine 5 mg tablet  Commonly known as: Norvasc      Take 1 tablet (5 mg) by mouth once daily.       aspirin 81 mg EC tablet           biotin 5 mg capsule           cyanocobalamin 100 mcg tablet  Commonly known as: Vitamin B-12           FLUoxetine 20 mg capsule  Commonly known as: PROzac      Take 1 capsule (20 mg) by mouth once daily.       ibuprofen 200 mg tablet           irbesartan 150 mg tablet  Commonly known as: Avapro      Take 1 tablet (150 mg) by mouth once daily.       meloxicam 15 mg tablet  Commonly known as: Mobic           metoprolol succinate XL 25 mg 24 hr tablet  Commonly known as: Toprol-XL      Take 1 tablet (25 mg) by mouth once daily.       multivitamin with minerals tablet           oxybutynin 5 mg tablet  Commonly known as: Ditropan      TAKE 1 TABLET TWICE DAILY       simvastatin 20 mg tablet  Commonly known as: Zocor      Take 1 tablet (20 mg) by mouth once daily in the evening.                Procedure  Critical Care    Performed by: Wilberto Chapman MD  Authorized by: Wilberto Chapman MD    Critical care provider statement:     Critical care time (minutes):  46    Critical care time was exclusive of:  Separately billable procedures and treating other patients    Critical care was necessary to treat or  prevent imminent or life-threatening deterioration of the following conditions:  CNS failure or compromise    Critical care was time spent personally by me on the following activities:  Development of treatment plan with patient or surrogate, discussions with consultants, evaluation of patient's response to treatment, examination of patient, obtaining history from patient or surrogate, ordering and performing treatments and interventions, ordering and review of laboratory studies, ordering and review of radiographic studies and re-evaluation of patient's condition    Care discussed with comment:  Dr. Hernandez, Dr. Amador, [place] admitting team       *This report was transcribed using voice recognition software.  Every effort was made to ensure accuracy; however, inadvertent computerized transcription errors may be present.*  Wilberto Chapman MD  07/17/24         Wilberto Chapman MD  07/17/24 1967

## 2024-07-17 NOTE — CONSULTS
Critical Care Medicine Consult      Reason For Consult  Critical care patient, TNK treatment for acute CVA    History Of Present Illness  Christine Horowitz is a 83 y.o. female with past medical history of hypertension, mild coronary artery disease, hyperlipidemia, mitral valve prolapse, depression, and OAB presented to [facility] ED complaints of severe dizziness, nausea, and vomiting starting 30 minutes prior to arrival.  Upon ED workup, patient was noted to have nystagmus and ataxia.  Her initial vital signs on presentation were temperature 36.7 °C, heart rate 79, respiratory rate 20, blood pressure 146/72, and SpO2 100% on room air.  ED labs revealed blood glucose 122, sodium 135, bicarbonate 19, APTT 24, , and remainder of CMP and CBC unremarkable.  Stroke alert was activated and patient received CT head under brain attack which did not demonstrate any acute pathology.  ED provider coordinated with [facility] stroke neurologist about concerns for stroke and TNK was recommended and given.  ED interventions included Reglan IV x 1, Benadryl IV x 1, 1 L IV fluid bolus, TNK administration, and neurology consult.    Patient seen and examined in ICU bed 15.  Patient alert and oriented x 4 and in no acute distress.  Patient stated that she was at home talking on the phone with her granddaughter when she suddenly became very dizzy, having feelings of being hot and cold, and seeing double chairs.  Patient stated that she got off the phone with her granddaughter because she was not feeling well and tried closing her eyes to see if that would help with the dizziness and did not have any improvement in symptoms so she called EMS.  Patient denies any dizziness or double vision/blurred vision at this time.  Patient states she feels 100% better.  She denies any syncopal episodes, chest pain, cough, shortness of breath, blurred/double vision, nausea, vomiting, diarrhea, abdominal pain, hematuria, hematochezia, hematemesis,  paresthesias, or any sick contacts.  NIHSS score 0.  Patient denies any tobacco use or drug use.  She states that she occasionally drinks a beer after doing yard work.  She denies having any episodes like this in the past.  Critical care will follow and likely will sign off tomorrow afternoon pending MRI.        Past Medical History:   Diagnosis Date    Personal history of other diseases of the digestive system     History of IBS    Personal history of other diseases of the nervous system and sense organs 12/15/2021    History of acute otitis externa     Past Surgical History:   Procedure Laterality Date    BLADDER SURGERY  05/12/2016    Bladder Surgery    HYSTERECTOMY  05/12/2016    Hysterectomy     Medications Prior to Admission   Medication Sig Dispense Refill Last Dose    amLODIPine (Norvasc) 5 mg tablet Take 1 tablet (5 mg) by mouth once daily. 90 tablet 3 7/17/2024    aspirin 81 mg EC tablet Take 1 tablet (81 mg) by mouth once daily.   7/17/2024    biotin 5 mg capsule Take 1 capsule (5 mg) by mouth once daily.   7/17/2024    cyanocobalamin (Vitamin B-12) 100 mcg tablet Take 1 tablet (100 mcg) by mouth once daily.   7/17/2024    FLUoxetine (PROzac) 20 mg capsule Take 1 capsule (20 mg) by mouth once daily. 90 capsule 3 7/17/2024    irbesartan (Avapro) 150 mg tablet Take 1 tablet (150 mg) by mouth once daily. 90 tablet 3 7/17/2024    meloxicam (Mobic) 15 mg tablet Take 1 tablet (15 mg) by mouth once daily as needed for mild pain (1 - 3) or moderate pain (4 - 6).   7/17/2024    metoprolol succinate XL (Toprol-XL) 25 mg 24 hr tablet Take 1 tablet (25 mg) by mouth once daily. 90 tablet 3 7/17/2024    multivitamin with minerals (multivitamin-iron-folic acid) tablet Take 1 tablet by mouth once daily.   7/17/2024    oxybutynin (Ditropan) 5 mg tablet TAKE 1 TABLET TWICE DAILY 180 tablet 3 7/17/2024    simvastatin (Zocor) 20 mg tablet Take 1 tablet (20 mg) by mouth once daily in the evening. 90 tablet 3 7/17/2024      Lisinopril  Social History     Tobacco Use    Smoking status: Never    Smokeless tobacco: Never   Substance Use Topics    Alcohol use: Yes     Alcohol/week: 1.0 standard drink of alcohol     Types: 1 Glasses of wine per week     Family History   Problem Relation Name Age of Onset    No Known Problems Mother      No Known Problems Father         Scheduled Medications:   [Held by provider] aspirin, 81 mg, oral, Daily  atorvastatin, 80 mg, oral, Nightly  [Held by provider] enoxaparin, 40 mg, subcutaneous, q24h  [START ON 7/18/2024] FLUoxetine, 20 mg, oral, Daily  oxybutynin, 5 mg, oral, BID  perflutren lipid microspheres, 0.5-10 mL of dilution, intravenous, Once in imaging  perflutren protein A microsphere, 0.5 mL, intravenous, Once in imaging  polyethylene glycol, 17 g, oral, Daily  sulfur hexafluoride microsphr, 2 mL, intravenous, Once in imaging         Continuous Medications:         PRN Medications:   PRN medications: hydrALAZINE **FOLLOWED BY** [START ON 7/19/2024] hydrALAZINE, labetaloL, oxygen    Review of Systems:  Review of Systems   Eyes:  Positive for visual disturbance.   Gastrointestinal:  Positive for nausea.   Neurological:  Positive for dizziness.        Objective   Vitals:  Most Recent:  Vitals:    07/17/24 1928   BP:    Pulse:    Resp:    Temp: 35.9 °C (96.7 °F)   SpO2:        24hr Min/Max:  Temp  Min: 35.9 °C (96.7 °F)  Max: 36.7 °C (98.1 °F)  Pulse  Min: 69  Max: 96  BP  Min: 135/60  Max: 179/90  Resp  Min: 12  Max: 29  SpO2  Min: 93 %  Max: 100 %        Intake/Output Summary (Last 24 hours) at 7/17/2024 1932  Last data filed at 7/17/2024 1558  Gross per 24 hour   Intake 1000 ml   Output --   Net 1000 ml           Physical exam:    Physical Exam  Constitutional:       General: She is not in acute distress.     Appearance: Normal appearance. She is not ill-appearing.   HENT:      Head: Normocephalic.      Nose: Nose normal.      Mouth/Throat:      Mouth: Mucous membranes are moist.   Eyes:       Extraocular Movements: Extraocular movements intact.      Pupils: Pupils are equal, round, and reactive to light.   Cardiovascular:      Rate and Rhythm: Normal rate and regular rhythm.      Pulses: Normal pulses.      Heart sounds: Normal heart sounds. No murmur heard.  Pulmonary:      Effort: Pulmonary effort is normal. No respiratory distress.      Breath sounds: Normal breath sounds.   Abdominal:      General: Bowel sounds are normal. There is no distension.      Palpations: Abdomen is soft.   Musculoskeletal:         General: Normal range of motion.      Cervical back: Normal range of motion.   Skin:     General: Skin is warm and dry.      Capillary Refill: Capillary refill takes less than 2 seconds.   Neurological:      General: No focal deficit present.      Mental Status: She is alert and oriented to person, place, and time. Mental status is at baseline.   Psychiatric:         Mood and Affect: Mood normal.         Behavior: Behavior normal.         Thought Content: Thought content normal.         Judgment: Judgment normal.          Lab/Radiology/Diagnostic Review:  Results for orders placed or performed during the hospital encounter of 07/17/24 (from the past 24 hour(s))   CBC and Auto Differential   Result Value Ref Range    WBC 7.6 4.4 - 11.3 x10*3/uL    nRBC 0.0 0.0 - 0.0 /100 WBCs    RBC 4.46 4.00 - 5.20 x10*6/uL    Hemoglobin 13.9 12.0 - 16.0 g/dL    Hematocrit 39.2 36.0 - 46.0 %    MCV 88 80 - 100 fL    MCH 31.2 26.0 - 34.0 pg    MCHC 35.5 32.0 - 36.0 g/dL    RDW 12.9 11.5 - 14.5 %    Platelets 226 150 - 450 x10*3/uL    Neutrophils % 53.7 40.0 - 80.0 %    Immature Granulocytes %, Automated 0.3 0.0 - 0.9 %    Lymphocytes % 36.1 13.0 - 44.0 %    Monocytes % 8.3 2.0 - 10.0 %    Eosinophils % 1.2 0.0 - 6.0 %    Basophils % 0.4 0.0 - 2.0 %    Neutrophils Absolute 4.09 1.60 - 5.50 x10*3/uL    Immature Granulocytes Absolute, Automated 0.02 0.00 - 0.50 x10*3/uL    Lymphocytes Absolute 2.74 0.80 - 3.00 x10*3/uL     Monocytes Absolute 0.63 0.05 - 0.80 x10*3/uL    Eosinophils Absolute 0.09 0.00 - 0.40 x10*3/uL    Basophils Absolute 0.03 0.00 - 0.10 x10*3/uL   Comprehensive metabolic panel   Result Value Ref Range    Glucose 122 (H) 74 - 99 mg/dL    Sodium 135 (L) 136 - 145 mmol/L    Potassium 3.6 3.5 - 5.3 mmol/L    Chloride 102 98 - 107 mmol/L    Bicarbonate 19 (L) 21 - 32 mmol/L    Anion Gap 18 10 - 20 mmol/L    Urea Nitrogen 18 6 - 23 mg/dL    Creatinine 0.93 0.50 - 1.05 mg/dL    eGFR 61 >60 mL/min/1.73m*2    Calcium 9.4 8.6 - 10.3 mg/dL    Albumin 4.2 3.4 - 5.0 g/dL    Alkaline Phosphatase 48 33 - 136 U/L    Total Protein 6.9 6.4 - 8.2 g/dL    AST 25 9 - 39 U/L    Bilirubin, Total 0.8 0.0 - 1.2 mg/dL    ALT 17 7 - 45 U/L   Troponin I, High Sensitivity   Result Value Ref Range    Troponin I, High Sensitivity 10 0 - 13 ng/L   Protime-INR   Result Value Ref Range    Protime 11.2 9.8 - 12.8 seconds    INR 1.0 0.9 - 1.1   APTT   Result Value Ref Range    aPTT 24 (L) 27 - 38 seconds   POCT GLUCOSE   Result Value Ref Range    POCT Glucose 133 (H) 74 - 99 mg/dL   Lipid Panel   Result Value Ref Range    Cholesterol 195 0 - 199 mg/dL    HDL-Cholesterol 61.1 mg/dL    Cholesterol/HDL Ratio 3.2     LDL Calculated 98 <=99 mg/dL    VLDL 36 0 - 40 mg/dL    Triglycerides 179 (H) 0 - 149 mg/dL    Non HDL Cholesterol 134 0 - 149 mg/dL   B-Type Natriuretic Peptide   Result Value Ref Range     (H) 0 - 99 pg/mL     CT brain attack head wo IV contrast    Result Date: 7/17/2024  Interpreted By:  Marianne Shields, STUDY: CT BRAIN ATTACK HEAD WO IV CONTRAST;  7/17/2024 3:21 pm   INDICATION: Signs/Symptoms:Dizziness. + Test of skew and bilateral nonexaustable with rotary nystagmus.   COMPARISON: 03/01/2013   ACCESSION NUMBER(S): [id number]   ORDERING CLINICIAN: CACHORRO RUIZ   TECHNIQUE: Noncontrast axial CT scan of head was performed. Angled reformats in brain and bone windows were generated. The images were reviewed in bone,  brain, blood and soft tissue windows.   FINDINGS: CSF Spaces: The ventricles, sulci and basal cisterns are mildly widened associated with diminished parenchymal volume but unchanged. There is no extraaxial fluid collection.   Parenchyma: Periventricular hypodensities are unchanged and compatible with chronic small-vessel ischemic changes. The grey-white differentiation is intact. There is no mass effect or midline shift. There is no intracranial hemorrhage.   Calvarium: The calvarium is unremarkable.   Paranasal sinuses and mastoids: Visualized paranasal sinuses and mastoids are clear.       No evidence of acute cortical infarct or intracranial hemorrhage.   No evidence of intracranial hemorrhage or displaced skull fracture.   MACRO: Marianne Shields discussed the significance and urgency of this critical finding by telephone with  CACHORRO RUIZ on 7/17/2024 at 3:29 pm.  (**-RCF-**) Findings:  See findings.     Signed by: Marianne Shields 7/17/2024 3:29 PM Dictation workstation:   [id number]      Assessment/Plan   Principal Problem:    Cerebrovascular accident (CVA), unspecified mechanism (Multi)  Active Problems:    Hypercholesterolemia    Primary hypertension    Suspected posterior stroke s/p TNK  -Presented with sudden onset dizziness with associated nausea and vomiting  -Nystagmus and ataxia in ED, NIH 0  -CT head negative for intracranial hemorrhage  -Received TNK at 1542 on 7/17/2024  -Lipid panel results: Total cholesterol 195, triglycerides 179, LDL 98, HDL 61.1  -CTA head and neck with and without IV contrast pending  -Resolution of symptoms after TNK  -Continuous telemetry monitoring  -Neurochecks and NIHSS per protocol post TNK  -Monitor for signs and symptoms of bleeding  -Avoid invasive procedures as able  -Follow CBC, BMP, magnesium, phosphorus  -MRI brain 24 hours post TNK  -Continue high intensity statin  -Hold antiplatelet therapy until after MRI, initiate once cleared by neurology  -Hold  all anticoagulation for at least 24 hours post TNK  -SCDs for DVT prophylaxis  -TTE with bubble study  -PT/OT evaluation  -Neurology following, input appreciated    History of irregular heartbeat  -History of irregular heartbeat but no history of atrial fibrillation  -Currently sinus rhythm on telemetry  -Continuous telemetry monitoring  -May need Holter monitor as outpatient  -EKG if irregular rhythm detected on telemetry    Hypertension  -History of hypertension  -Allow for permissive hypertension x 48 hours in setting of suspected ischemic stroke  -Home antihypertensive medication on hold in setting of permissive hypertension, resume when appropriate  -Labetalol and hydralazine as needed x 48 hours for systolic blood pressure greater than 180 mmHg or diastolic blood pressure greater than 105 mmHg     Hyperlipidemia  -Lipid panel results: Total cholesterol 195, triglycerides 179, LDL 98, HDL 61.1  -Continue atorvastatin 80 mg daily    History of mild coronary artery disease and mitral valve prolapse  -History of mild CAD by CTA in 2013 and no history of heart failure  -Hold aspirin until after MRI on 7/18/2024  -Continue high intensity statin  -Follows with cardiology as outpatient  -TTE with bubble study as above    Advance care planning  -Discussed with patient about goals of care and CODE STATUS.  Patient reports that she has a signed [place] DNR at home and would like that ordered here at the hospital.  No sign DNR in patient's chart at this time.  Explained the differences between full code and DNR.  Stated that she would not want intubated if she were to develop any respiratory distress and does not want any resuscitation in the event of cardiac arrest.  Patient stated that she is 83 years old and would not want anything done if her heart stops.  CODE STATUS changed to DNR DNI per patient request.    I spent 50 minutes of cumulative critical care time with the patient.  Greater than 50% of that time was spent  in the direct collaboration and or coordination of care of the patient.     Dragon dictation software was used to dictate this note and thus there may be minor errors in translation/transcription including garbled speech or misspellings. Please contact for clarification if needed.

## 2024-07-17 NOTE — CONSULTS
[facility] Consult Note    Inpatient consult to Neurology  Consult performed by: CAL Whittington-CNP  Consult ordered by: CAL Mixon-CHAPITO         Subjective   Christine Horowitz is a 83 y.o. female on day 0 of admission with a history of HTN, mild CAD, and DLD presenting with Cerebrovascular accident (CVA), unspecified mechanism (Multi). Neurology was consulted for stroke s/p TNK.    History obtained from patient and discussion with ED physician.    Pt reports that she was at home on the phone with her granddaughter when she suddenly was very dizzy. Describes as vertigo. States also feeling hot and cold. Denies any HA, difficulty speaking, dysarthria, vision loss, weakness or numbness tingling.     When in the ED, was noted to have truncal ataxia and was unable to even sit up. Physician noted bilateral horizontal inexhaustible nystagmus, rotary nystagmus and +skew. NIHSS was zero. She was found to be candidate for TNK and it was given.     Pt states history of irregular heart rate but not atrial fibrillation.     Is on 81 mg aspirin daily and simvastatin outpatient.     When seen s/p TNK, pt states she feels 100% better.     Past Medical History:   Diagnosis Date    Personal history of other diseases of the digestive system     History of IBS    Personal history of other diseases of the nervous system and sense organs 12/15/2021    History of acute otitis externa     Past Surgical History:   Procedure Laterality Date    BLADDER SURGERY  05/12/2016    Bladder Surgery    HYSTERECTOMY  05/12/2016    Hysterectomy     Family history of heart failure in mother and bladder cancer in father.     Social History     Substance and Sexual Activity   Drug Use Not on file     Tobacco Use: Low Risk  (7/17/2024)    Patient History     Smoking Tobacco Use: Never     Smokeless Tobacco Use: Never     Passive Exposure: Not on file     Alcohol Use: Not At Risk (7/17/2024)    AUDIT-C     Frequency of Alcohol  Consumption: Never     Average Number of Drinks: Patient does not drink     Frequency of Binge Drinking: Never       10 point review of systems performed and is negative except as noted in the HPI.        Objective   Vitals:    07/17/24 1730 07/17/24 1745 07/17/24 1747 07/17/24 1815   BP: 154/76 139/78  179/90   BP Location:       Patient Position:       Pulse:  69     Resp:  13  26   Temp:   36.3 °C (97.3 °F)    TempSrc:       SpO2:  96%     Weight:       Height:             Physical Exam  Vitals reviewed.   Eyes:      General: Lids are normal.      Extraocular Movements: Extraocular movements intact.      Pupils: Pupils are equal, round, and reactive to light.   Neurological:      Motor: Motor strength is normal.     Deep Tendon Reflexes:      Reflex Scores:       Bicep reflexes are 2+ on the right side and 2+ on the left side.       Brachioradialis reflexes are 2+ on the right side and 2+ on the left side.       Patellar reflexes are 2+ on the right side and 2+ on the left side.  Psychiatric:         Speech: Speech normal.       Neurological Exam  Mental Status  Awake, alert and oriented to person, place and time. Speech is normal. Language is fluent with no aphasia.    Cranial Nerves  CN II: Visual acuity is normal. Visual fields full to confrontation.  CN III, IV, VI: Extraocular movements intact bilaterally. Normal lids and orbits bilaterally. Pupils equal round and reactive to light bilaterally.  CN V: Facial sensation is normal.  CN VII: Full and symmetric facial movement.  CN VIII: Hearing is normal.  CN IX, X: Palate elevates symmetrically. Normal gag reflex.  CN XI: Shoulder shrug strength is normal.  CN XII: Tongue midline without atrophy or fasciculations.    Motor  Normal muscle bulk throughout. No fasciculations present. Normal muscle tone. No abnormal involuntary movements. Strength is 5/5 throughout all four extremities.    Sensory  Light touch is normal in upper and lower extremities.     Reflexes                                             Right                      Left  Brachioradialis                    2+                         2+  Biceps                                 2+                         2+  Patellar                                2+                         2+    Coordination  Right: Finger-to-nose normal. Heel-to-shin normal.Left: Finger-to-nose normal. Heel-to-shin normal.    Gait  Casual gait is normal including stance, stride, and arm swing.      Scheduled medications  [Held by provider] aspirin, 81 mg, oral, Daily  atorvastatin, 80 mg, oral, Nightly  [Held by provider] enoxaparin, 40 mg, subcutaneous, q24h  perflutren lipid microspheres, 0.5-10 mL of dilution, intravenous, Once in imaging  perflutren protein A microsphere, 0.5 mL, intravenous, Once in imaging  polyethylene glycol, 17 g, oral, Daily  sulfur hexafluoride microsphr, 2 mL, intravenous, Once in imaging      Continuous medications     PRN medications  PRN medications: hydrALAZINE **FOLLOWED BY** [START ON 7/19/2024] hydrALAZINE, labetaloL, oxygen     Lab Results   Component Value Date    WBC 7.6 07/17/2024    RBC 4.46 07/17/2024    HGB 13.9 07/17/2024    HCT 39.2 07/17/2024     07/17/2024     (L) 07/17/2024    K 3.6 07/17/2024     07/17/2024    BUN 18 07/17/2024    CREATININE 0.93 07/17/2024    EGFR 61 07/17/2024    CALCIUM 9.4 07/17/2024    ALKPHOS 48 07/17/2024    AST 25 07/17/2024    ALT 17 07/17/2024    LDLCALC 98 07/17/2024    CHOL 195 07/17/2024    HDL 61.1 07/17/2024    TRIG 179 (H) 07/17/2024       Below CT and MRI images and reports have been personally reviewed in PACS, agree with interpretations.    CT brain attack head wo IV contrast 07/17/2024    Narrative  Interpreted By:  Marianne Shields,  STUDY:  CT BRAIN ATTACK HEAD WO IV CONTRAST;  7/17/2024 3:21 pm    INDICATION:  Signs/Symptoms:Dizziness. + Test of skew and bilateral nonexaustable  with rotary  nystagmus.    COMPARISON:  03/01/2013    ACCESSION NUMBER(S):  [id number]    ORDERING CLINICIAN:  CACHORRO RUIZ    TECHNIQUE:  Noncontrast axial CT scan of head was performed. Angled reformats in  brain and bone windows were generated. The images were reviewed in  bone, brain, blood and soft tissue windows.    FINDINGS:  CSF Spaces: The ventricles, sulci and basal cisterns are mildly  widened associated with diminished parenchymal volume but unchanged.  There is no extraaxial fluid collection.    Parenchyma: Periventricular hypodensities are unchanged and  compatible with chronic small-vessel ischemic changes. The grey-white  differentiation is intact. There is no mass effect or midline shift.  There is no intracranial hemorrhage.    Calvarium: The calvarium is unremarkable.    Paranasal sinuses and mastoids: Visualized paranasal sinuses and  mastoids are clear.    Impression  No evidence of acute cortical infarct or intracranial hemorrhage.    No evidence of intracranial hemorrhage or displaced skull fracture.    MACRO:  Marianne Shields discussed the significance and urgency of this  critical finding by telephone with  CACHORRO RUIZ on  7/17/2024 at 3:29 pm.  (**-RCF-**) Findings:  See findings.      Signed by: Marianne Shields 7/17/2024 3:29 PM  Dictation workstation:   [id number]      NIH Stroke Scale  1A. Level of Consciousness: Alert, Keenly Responsive  1B. Ask Month and Age: Both Questions Right  1C. Blink Eyes & Squeeze Hands: Performs Both Tasks  2. Best Gaze: Normal  3. Visual: No Visual Loss  4. Facial Palsy: Normal Symmetrical Movements  5A. Motor - Left Arm: No Drift  5B. Motor - Right Arm: No Drift  6A. Motor - Left Leg: No Drift  6B. Motor - Right Leg: No Drift  7. Limb Ataxia: Absent  8. Sensory Loss: Normal  9. Best Language: No Aphasia  10. Dysarthria: Normal  11. Extinction and Inattention: No Abnormality  NIH Stroke Scale: 0      Sharon Coma Scale  Best Eye Response:  Spontaneous  Best Verbal Response: Oriented  Best Motor Response: Follows commands  Sharon Coma Scale Score: 15        Assessment/Plan   This patient currently has cardiac telemetry ordered; if you would like to modify or discontinue the telemetry order, click here to go to the orders activity to modify/discontinue the order.  Principal Problem:    Cerebrovascular accident (CVA), unspecified mechanism (Multi)  Active Problems:    Hypercholesterolemia    Primary hypertension      IMPRESSION:  Christine Horowitz is a 83 y.o. female with a history of HTN, mild CAD, and DLD presenting with dizziness. Neurology was consulted for stroke s/p TNK. On ASA 81 mg and statin PTA.    Suspected posterior stroke  S/p TNK  Dizziness, truncal ataxia, nystagmus - all resolved    RECOMMENDATIONS:  Continue supportive care.   MRI brain wo contrast 24 hours s/p TNK  Hold antiplatelet until MRI resulted.   HI statin.  TTE with bubble study.   A1C and lipid panel.   CTA head and neck    Discussed with patient, all questions answered.   Seen with Dr. Amador again when in ICU.   Will follow up tomorrow.   She will follow up with myself in office in ~4 weeks outpatient.        I personally spent 75 minutes today, exclusive of procedures, providing care for this patient, including preparation, face to face time, documentation and other services such as review of medical records, diagnostic result, patient education, counseling, coordination of care as specified in the encounter.    CAL Whittington-CNP    I agree with the above.  I saw and examined the patient with the NP and was present for the entirety of the clinical encounter.  The note above has been edited as appropriate.  The plan of care was mostly mine with input from the NP.    The patient is an 83-year-old female who presented with dizziness and difficulties with ambulation.  The patient received IV TNK.  Currently the patient is doing much better and feels essentially  back to her baseline.  Her neurological examination is normal.  The patient does need a complete stroke workup.  I discussed all these issues in detail with the patient and answered all of her questions.  I will continue to follow patient while she is in hospital.  On discharge, the patient will need to follow-up with her primary care doctor in 1 week and with the neurology NP in 1 month.

## 2024-07-17 NOTE — PROGRESS NOTES
PHARMACY STROKE RESPONSE      Patient Name: Christine Horowitz  MRN: [id number]  Location: [place] [address]    Patient Weight (kg):   Wt Readings from Last 1 Encounters:   07/17/24 78.4 kg (172 lb 13.5 oz)        An acute Brain Attack has been activated, pharmacy participated in multidisciplinary team bedside response for Christine Horowitz.  Contraindications for fibrinolytic therapy have been reviewed by pharmacy and any issues relating to medication therapy have been discussed directly with the provider(s) caring for this patient.     Pharmacy aided in the procurement, preparation, facilitation, bedside response for fibrinolytic therapy. Patient did fibrinolysis. Tenecteplase (TNKase) Dose administered:  20 mg.    Orders Placed This Encounter      diphenhydrAMINE (BENADryl) injection 25 mg      metoclopramide (Reglan) injection  - Omnicell Override Pull      metoclopramide (Reglan) injection 10 mg      sodium chloride 0.9 % bolus 1,000 mL      tenecteplase (TNKASE) injection for STROKE  - Omnicell Override Pull      tenecteplase (TNKASE) injection for STROKE 20 mg      Thank you for allowing me to take part in the care of this patient.     Rena Wayne, PharmD  7/17/2024  3:43 PM         References:    Neurological [facility] Stroke Tools   Neurological [place] IV Thrombolysis Checklist

## 2024-07-17 NOTE — H&P
History Obtained From: patient    History Of Present Illness:  Christine Horowitz is a 83 y.o. female with PMHx s/f HTN, mild CAD by cardiac cta 2013, dyslipidemia presenting with Dizziness.  Patient presented to emergency department via EMS due to complaints of severe dizziness.  Patient's dizziness started about 30 minutes prior to coming to the hospital she closed her eyes tried to put her head down none of this improved her dizziness.  Patient's dizziness was not associated with changes in position or turning her head.  The patient did have an episode of emesis.  Patient denies any syncope or near syncope no palpitations chest pain.  She has not had any recent urinary tract symptoms no dysuria urgency or frequency.  On arrival to the emergency department patient was noted to have nystagmus and ataxia.  Patient's initial vital signs showed temperature 98.1 respiratory rate 20 blood pressure 146/72 SpO2 100% her blood work shows sodium 135 bicarb 19 glucose 122 remainder of comprehensive metabolic panel within normal limits.  CBC shows no anemia thrombocytopenia or leukocytosis.  A stat CT of the head under brain attack protocol was obtained showing no evidence of acute cortical infarct or intracranial hemorrhage.  In light of the patient's symptoms she was given thrombolytic therapy.  The patient reports that with the administration of thrombolytics her nausea and dizziness improved markedly.  The patient is not had any recurrence of symptoms plan is to admit the patient to the intensive care unit to continue with postthrombolytic protocol, and to continue further evaluation and treatment of this patient with an acute ischemic stroke.      ED Course:  Diagnoses as of 07/17/24 1721   Cerebrovascular accident (CVA), unspecified mechanism (Multi)     Relevant Results  Results for orders placed or performed during the hospital encounter of 07/17/24 (from the past 24 hour(s))   CBC and Auto Differential   Result  Value Ref Range    WBC 7.6 4.4 - 11.3 x10*3/uL    nRBC 0.0 0.0 - 0.0 /100 WBCs    RBC 4.46 4.00 - 5.20 x10*6/uL    Hemoglobin 13.9 12.0 - 16.0 g/dL    Hematocrit 39.2 36.0 - 46.0 %    MCV 88 80 - 100 fL    MCH 31.2 26.0 - 34.0 pg    MCHC 35.5 32.0 - 36.0 g/dL    RDW 12.9 11.5 - 14.5 %    Platelets 226 150 - 450 x10*3/uL    Neutrophils % 53.7 40.0 - 80.0 %    Immature Granulocytes %, Automated 0.3 0.0 - 0.9 %    Lymphocytes % 36.1 13.0 - 44.0 %    Monocytes % 8.3 2.0 - 10.0 %    Eosinophils % 1.2 0.0 - 6.0 %    Basophils % 0.4 0.0 - 2.0 %    Neutrophils Absolute 4.09 1.60 - 5.50 x10*3/uL    Immature Granulocytes Absolute, Automated 0.02 0.00 - 0.50 x10*3/uL    Lymphocytes Absolute 2.74 0.80 - 3.00 x10*3/uL    Monocytes Absolute 0.63 0.05 - 0.80 x10*3/uL    Eosinophils Absolute 0.09 0.00 - 0.40 x10*3/uL    Basophils Absolute 0.03 0.00 - 0.10 x10*3/uL   Comprehensive metabolic panel   Result Value Ref Range    Glucose 122 (H) 74 - 99 mg/dL    Sodium 135 (L) 136 - 145 mmol/L    Potassium 3.6 3.5 - 5.3 mmol/L    Chloride 102 98 - 107 mmol/L    Bicarbonate 19 (L) 21 - 32 mmol/L    Anion Gap 18 10 - 20 mmol/L    Urea Nitrogen 18 6 - 23 mg/dL    Creatinine 0.93 0.50 - 1.05 mg/dL    eGFR 61 >60 mL/min/1.73m*2    Calcium 9.4 8.6 - 10.3 mg/dL    Albumin 4.2 3.4 - 5.0 g/dL    Alkaline Phosphatase 48 33 - 136 U/L    Total Protein 6.9 6.4 - 8.2 g/dL    AST 25 9 - 39 U/L    Bilirubin, Total 0.8 0.0 - 1.2 mg/dL    ALT 17 7 - 45 U/L   Troponin I, High Sensitivity   Result Value Ref Range    Troponin I, High Sensitivity 10 0 - 13 ng/L   Protime-INR   Result Value Ref Range    Protime 11.2 9.8 - 12.8 seconds    INR 1.0 0.9 - 1.1   APTT   Result Value Ref Range    aPTT 24 (L) 27 - 38 seconds   POCT GLUCOSE   Result Value Ref Range    POCT Glucose 133 (H) 74 - 99 mg/dL      CT brain attack head wo IV contrast    Result Date: 7/17/2024  Interpreted By:  Marianne Shields, STUDY: CT BRAIN ATTACK HEAD WO IV CONTRAST;  7/17/2024 3:21 pm    INDICATION: Signs/Symptoms:Dizziness. + Test of skew and bilateral nonexaustable with rotary nystagmus.   COMPARISON: 03/01/2013   ACCESSION NUMBER(S): [id number]   ORDERING CLINICIAN: CACHORRO RUIZ   TECHNIQUE: Noncontrast axial CT scan of head was performed. Angled reformats in brain and bone windows were generated. The images were reviewed in bone, brain, blood and soft tissue windows.   FINDINGS: CSF Spaces: The ventricles, sulci and basal cisterns are mildly widened associated with diminished parenchymal volume but unchanged. There is no extraaxial fluid collection.   Parenchyma: Periventricular hypodensities are unchanged and compatible with chronic small-vessel ischemic changes. The grey-white differentiation is intact. There is no mass effect or midline shift. There is no intracranial hemorrhage.   Calvarium: The calvarium is unremarkable.   Paranasal sinuses and mastoids: Visualized paranasal sinuses and mastoids are clear.       No evidence of acute cortical infarct or intracranial hemorrhage.   No evidence of intracranial hemorrhage or displaced skull fracture.   MACRO: Marianne Shields discussed the significance and urgency of this critical finding by telephone with  CACHORRO RUIZ on 7/17/2024 at 3:29 pm.  (**-RCF-**) Findings:  See findings.     Signed by: Marianne Shields 7/17/2024 3:29 PM Dictation workstation:   [id number]     Scheduled medications:  [START ON 7/18/2024] aspirin, 81 mg, oral, Daily  atorvastatin, 80 mg, oral, Nightly  [START ON 7/18/2024] enoxaparin, 40 mg, subcutaneous, q24h  perflutren lipid microspheres, 0.5-10 mL of dilution, intravenous, Once in imaging  perflutren protein A microsphere, 0.5 mL, intravenous, Once in imaging  polyethylene glycol, 17 g, oral, Daily  sulfur hexafluoride microsphr, 2 mL, intravenous, Once in imaging      Continuous medications:     PRN medications:  PRN medications: hydrALAZINE **FOLLOWED BY** [START ON 7/19/2024] hydrALAZINE,  labetaloL, oxygen      Past Medical History  She has a past medical history of Personal history of other diseases of the digestive system and Personal history of other diseases of the nervous system and sense organs (12/15/2021). HTN, hyperlipidemia, mild CAD by cardiac [id number]    Surgical History  She has a past surgical history that includes Hysterectomy (05/12/2016) and Bladder surgery (05/12/2016).     Social History  She reports that she has never smoked. She has never used smokeless tobacco. She reports current alcohol use of about 1.0 standard drink of alcohol per week. No history on file for drug use.    Family History  Family History   Problem Relation Name Age of Onset    No Known Problems Mother      No Known Problems Father          Allergies  Lisinopril    Code Status  Full Code     Review of Systems   Constitutional:  Negative for appetite change, chills, fatigue, fever and unexpected weight change.   HENT:  Negative for congestion, ear discharge, ear pain, mouth sores, nosebleeds, sinus pain, sore throat, trouble swallowing and voice change.    Eyes:  Negative for photophobia, pain, discharge, itching and visual disturbance.   Respiratory:  Negative for apnea, cough, choking, chest tightness, shortness of breath and wheezing.    Cardiovascular:  Negative for chest pain, palpitations and leg swelling.   Gastrointestinal:  Positive for nausea. Negative for abdominal distention, abdominal pain, blood in stool, constipation, diarrhea and vomiting.   Endocrine: Negative for cold intolerance, heat intolerance, polydipsia, polyphagia and polyuria.   Genitourinary:  Negative for decreased urine volume, difficulty urinating, dysuria, flank pain, frequency, hematuria and urgency.   Musculoskeletal:  Negative for arthralgias, back pain, gait problem, myalgias, neck pain and neck stiffness.   Skin:  Negative for color change, pallor and wound.   Allergic/Immunologic: Negative for food allergies and  immunocompromised state.   Neurological:  Positive for dizziness. Negative for tremors, seizures, syncope, speech difficulty, weakness, light-headedness, numbness and headaches.   Hematological:  Negative for adenopathy. Does not bruise/bleed easily.   Psychiatric/Behavioral:  Negative for confusion, dysphoric mood, hallucinations, sleep disturbance and suicidal ideas. The patient is not nervous/anxious.        Last Recorded Vitals  /84   Temp 36.7 °C (98.1 °F) (Temporal)   Resp 20   Wt 78.4 kg (172 lb 13.5 oz)   SpO2 100%      Physical Exam  Vitals reviewed.   Constitutional:       General: She is not in acute distress.  HENT:      Head: Normocephalic and atraumatic.      Right Ear: External ear normal.      Left Ear: External ear normal.      Nose: Nose normal.      Mouth/Throat:      Mouth: Mucous membranes are moist.      Pharynx: Oropharynx is clear.   Eyes:      General: No scleral icterus.     Extraocular Movements: Extraocular movements intact.      Conjunctiva/sclera: Conjunctivae normal.      Pupils: Pupils are equal, round, and reactive to light.   Cardiovascular:      Rate and Rhythm: Normal rate and regular rhythm.      Pulses: Normal pulses.      Heart sounds: Normal heart sounds. No murmur heard.  Pulmonary:      Effort: Pulmonary effort is normal. No respiratory distress.      Breath sounds: Normal breath sounds. No wheezing, rhonchi or rales.   Chest:      Chest wall: No tenderness.   Abdominal:      General: Bowel sounds are normal. There is no distension.      Palpations: Abdomen is soft. There is no mass.      Tenderness: There is no abdominal tenderness. There is no rebound.   Musculoskeletal:         General: No swelling or deformity. Normal range of motion.      Cervical back: Normal range of motion.      Right lower leg: No edema.      Left lower leg: No edema.   Skin:     General: Skin is warm and dry.      Capillary Refill: Capillary refill takes less than 2 seconds.    Neurological:      General: No focal deficit present.      Mental Status: She is alert and oriented to person, place, and time.      Cranial Nerves: No cranial nerve deficit.      Sensory: No sensory deficit.      Motor: No weakness.      Coordination: Coordination normal.   Psychiatric:         Mood and Affect: Mood normal.         Behavior: Behavior normal.         Assessment/Plan   Principal Problem:    Cerebrovascular accident (CVA), unspecified mechanism (Multi)    Acute CVA s/p Thrombolytic therapy  Admit to ICU w consult to intensivist and Neurology  Continue per protocol  Start STATIN, initiate asa in 24hrs  CTA head, neck  MRI Brain  Echocardiogram w bubble study  PT/OT evaluation    History of irregular heart beat  Monitor telemetry  No hx of afib may need event monitor as outpt    Hyperlipidemia  Check lipid panel, aggressively lower LDL to 70 or better    HTN  Home meds on hold, continue prn per protocol            No new Assessment & Plan notes have been filed under this hospital service since the last note was generated.  Service: Internal Medicine          Rikki Woodall, APRN-CNP    Dragon dictation software was used to dictate this note and thus there may be minor errors in translation/transcription including garbled speech or misspellings. Please contact for clarification if needed.

## 2024-07-17 NOTE — CARE PLAN
Problem: Skin  Goal: Prevent/manage excess moisture  Outcome: Progressing  Flowsheets (Taken 7/17/2024 1814)  Prevent/manage excess moisture: Monitor for/manage infection if present  Goal: Prevent/minimize sheer/friction injuries  Outcome: Progressing  Flowsheets (Taken 7/17/2024 1814)  Prevent/minimize sheer/friction injuries:   Use pull sheet   HOB 30 degrees or less   Turn/reposition every 2 hours/use positioning/transfer devices     Problem: ACS/CP/NSTEMI/STEMI  Goal: Lab values return to normal range  Outcome: Progressing  Goal: Promote self management  Outcome: Progressing  Goal: Verbalize understanding of procedures/devices  Outcome: Progressing  Goal: Wean vasopressors/achieve hemodynamic stability  Outcome: Progressing     Problem: Arrythmia/Dysrhythmia  Goal: Lab values return to normal range  Outcome: Progressing  Goal: Promote self management  Outcome: Progressing   The patient's goals for the shift include      The clinical goals for the shift include

## 2024-07-18 ENCOUNTER — APPOINTMENT (OUTPATIENT)
Dept: RADIOLOGY | Facility: HOSPITAL | Age: 84
End: 2024-07-18
Payer: MEDICARE

## 2024-07-18 ENCOUNTER — APPOINTMENT (OUTPATIENT)
Dept: CARDIOLOGY | Facility: HOSPITAL | Age: 84
End: 2024-07-18
Payer: MEDICARE

## 2024-07-18 LAB
ANION GAP SERPL CALC-SCNC: 12 MMOL/L (ref 10–20)
AORTIC VALVE MEAN GRADIENT: 5 MMHG
AORTIC VALVE PEAK VELOCITY: 1.44 M/S
ATRIAL RATE: 72 BPM
AV PEAK GRADIENT: 8.3 MMHG
AVA (PEAK VEL): 2.01 CM2
AVA (VTI): 2.1 CM2
BASOPHILS # BLD AUTO: 0.02 X10*3/UL (ref 0–0.1)
BASOPHILS NFR BLD AUTO: 0.3 %
BUN SERPL-MCNC: 13 MG/DL (ref 6–23)
CALCIUM SERPL-MCNC: 9 MG/DL (ref 8.6–10.3)
CHLORIDE SERPL-SCNC: 106 MMOL/L (ref 98–107)
CO2 SERPL-SCNC: 24 MMOL/L (ref 21–32)
CREAT SERPL-MCNC: 0.74 MG/DL (ref 0.5–1.05)
EGFRCR SERPLBLD CKD-EPI 2021: 80 ML/MIN/1.73M*2
EJECTION FRACTION APICAL 4 CHAMBER: 60.7
EJECTION FRACTION: 63 %
EOSINOPHIL # BLD AUTO: 0.08 X10*3/UL (ref 0–0.4)
EOSINOPHIL NFR BLD AUTO: 1.1 %
ERYTHROCYTE [DISTWIDTH] IN BLOOD BY AUTOMATED COUNT: 13.2 % (ref 11.5–14.5)
EST. AVERAGE GLUCOSE BLD GHB EST-MCNC: 123 MG/DL
GLUCOSE BLD MANUAL STRIP-MCNC: 100 MG/DL (ref 74–99)
GLUCOSE BLD MANUAL STRIP-MCNC: 103 MG/DL (ref 74–99)
GLUCOSE BLD MANUAL STRIP-MCNC: 105 MG/DL (ref 74–99)
GLUCOSE SERPL-MCNC: 113 MG/DL (ref 74–99)
HBA1C MFR BLD: 5.9 %
HCT VFR BLD AUTO: 40.1 % (ref 36–46)
HGB BLD-MCNC: 13.5 G/DL (ref 12–16)
IMM GRANULOCYTES # BLD AUTO: 0.02 X10*3/UL (ref 0–0.5)
IMM GRANULOCYTES NFR BLD AUTO: 0.3 % (ref 0–0.9)
LEFT ATRIUM VOLUME AREA LENGTH INDEX BSA: 43.5 ML/M2
LEFT VENTRICLE INTERNAL DIMENSION DIASTOLE: 4.7 CM (ref 3.5–6)
LEFT VENTRICULAR OUTFLOW TRACT DIAMETER: 2 CM
LV EJECTION FRACTION BIPLANE: 63 %
LYMPHOCYTES # BLD AUTO: 1.95 X10*3/UL (ref 0.8–3)
LYMPHOCYTES NFR BLD AUTO: 27.2 %
MAGNESIUM SERPL-MCNC: 2.18 MG/DL (ref 1.6–2.4)
MCH RBC QN AUTO: 30.5 PG (ref 26–34)
MCHC RBC AUTO-ENTMCNC: 33.7 G/DL (ref 32–36)
MCV RBC AUTO: 91 FL (ref 80–100)
MITRAL VALVE E/A RATIO: 1.16
MITRAL VALVE E/E' RATIO: 14.78
MONOCYTES # BLD AUTO: 0.55 X10*3/UL (ref 0.05–0.8)
MONOCYTES NFR BLD AUTO: 7.7 %
NEUTROPHILS # BLD AUTO: 4.55 X10*3/UL (ref 1.6–5.5)
NEUTROPHILS NFR BLD AUTO: 63.4 %
NRBC BLD-RTO: 0 /100 WBCS (ref 0–0)
P AXIS: 112 DEGREES
PHOSPHATE SERPL-MCNC: 3.1 MG/DL (ref 2.5–4.9)
PLATELET # BLD AUTO: 235 X10*3/UL (ref 150–450)
POTASSIUM SERPL-SCNC: 3.8 MMOL/L (ref 3.5–5.3)
PR INTERVAL: 223 MS
Q ONSET: 249 MS
QRS COUNT: 11 BEATS
QRS DURATION: 104 MS
QT INTERVAL: 455 MS
QTC CALCULATION(BAZETT): 499 MS
QTC FREDERICIA: 483 MS
R AXIS: 63 DEGREES
RBC # BLD AUTO: 4.43 X10*6/UL (ref 4–5.2)
RIGHT VENTRICLE FREE WALL PEAK S': 17.7 CM/S
RIGHT VENTRICLE PEAK SYSTOLIC PRESSURE: 40.9 MMHG
SODIUM SERPL-SCNC: 138 MMOL/L (ref 136–145)
T AXIS: 101 DEGREES
T OFFSET: 477 MS
TRICUSPID ANNULAR PLANE SYSTOLIC EXCURSION: 2.7 CM
VENTRICULAR RATE: 72 BPM
WBC # BLD AUTO: 7.2 X10*3/UL (ref 4.4–11.3)

## 2024-07-18 PROCEDURE — 36415 COLL VENOUS BLD VENIPUNCTURE: CPT | Performed by: NURSE PRACTITIONER

## 2024-07-18 PROCEDURE — 83735 ASSAY OF MAGNESIUM: CPT

## 2024-07-18 PROCEDURE — 82947 ASSAY GLUCOSE BLOOD QUANT: CPT

## 2024-07-18 PROCEDURE — 97116 GAIT TRAINING THERAPY: CPT | Mod: GP | Performed by: PHYSICAL THERAPIST

## 2024-07-18 PROCEDURE — 85025 COMPLETE CBC W/AUTO DIFF WBC: CPT | Performed by: NURSE PRACTITIONER

## 2024-07-18 PROCEDURE — 84100 ASSAY OF PHOSPHORUS: CPT

## 2024-07-18 PROCEDURE — 97161 PT EVAL LOW COMPLEX 20 MIN: CPT | Mod: GP | Performed by: PHYSICAL THERAPIST

## 2024-07-18 PROCEDURE — 2500000001 HC RX 250 WO HCPCS SELF ADMINISTERED DRUGS (ALT 637 FOR MEDICARE OP)

## 2024-07-18 PROCEDURE — 70551 MRI BRAIN STEM W/O DYE: CPT

## 2024-07-18 PROCEDURE — 93306 TTE W/DOPPLER COMPLETE: CPT | Performed by: INTERNAL MEDICINE

## 2024-07-18 PROCEDURE — 70551 MRI BRAIN STEM W/O DYE: CPT | Performed by: RADIOLOGY

## 2024-07-18 PROCEDURE — 93306 TTE W/DOPPLER COMPLETE: CPT

## 2024-07-18 PROCEDURE — 2500000002 HC RX 250 W HCPCS SELF ADMINISTERED DRUGS (ALT 637 FOR MEDICARE OP, ALT 636 FOR OP/ED): Performed by: NURSE PRACTITIONER

## 2024-07-18 PROCEDURE — 97165 OT EVAL LOW COMPLEX 30 MIN: CPT | Mod: GO

## 2024-07-18 PROCEDURE — 99233 SBSQ HOSP IP/OBS HIGH 50: CPT | Performed by: PSYCHIATRY & NEUROLOGY

## 2024-07-18 PROCEDURE — 2060000001 HC INTERMEDIATE ICU ROOM DAILY

## 2024-07-18 PROCEDURE — 99233 SBSQ HOSP IP/OBS HIGH 50: CPT | Performed by: INTERNAL MEDICINE

## 2024-07-18 PROCEDURE — 2500000001 HC RX 250 WO HCPCS SELF ADMINISTERED DRUGS (ALT 637 FOR MEDICARE OP): Performed by: NURSE PRACTITIONER

## 2024-07-18 PROCEDURE — 82374 ASSAY BLOOD CARBON DIOXIDE: CPT | Performed by: NURSE PRACTITIONER

## 2024-07-18 RX ORDER — CLOPIDOGREL BISULFATE 75 MG/1
75 TABLET ORAL DAILY
Status: DISCONTINUED | OUTPATIENT
Start: 2024-07-18 | End: 2024-07-19 | Stop reason: HOSPADM

## 2024-07-18 RX ORDER — ASPIRIN 81 MG/1
81 TABLET ORAL DAILY
Status: DISCONTINUED | OUTPATIENT
Start: 2024-07-18 | End: 2024-07-19 | Stop reason: HOSPADM

## 2024-07-18 ASSESSMENT — COGNITIVE AND FUNCTIONAL STATUS - GENERAL
DAILY ACTIVITIY SCORE: 20
DRESSING REGULAR LOWER BODY CLOTHING: A LITTLE
HELP NEEDED FOR BATHING: A LITTLE
CLIMB 3 TO 5 STEPS WITH RAILING: A LITTLE
WALKING IN HOSPITAL ROOM: A LITTLE
MOBILITY SCORE: 22
DRESSING REGULAR UPPER BODY CLOTHING: A LITTLE
TOILETING: A LITTLE

## 2024-07-18 ASSESSMENT — ACTIVITIES OF DAILY LIVING (ADL)
ADL_ASSISTANCE: INDEPENDENT
BATHING_ASSISTANCE: MINIMAL
ADL_ASSISTANCE: INDEPENDENT
LACK_OF_TRANSPORTATION: NO

## 2024-07-18 ASSESSMENT — PAIN SCALES - GENERAL
PAINLEVEL_OUTOF10: 0 - NO PAIN

## 2024-07-18 ASSESSMENT — VISUAL ACUITY: VA_NORMAL: 1

## 2024-07-18 ASSESSMENT — PAIN - FUNCTIONAL ASSESSMENT
PAIN_FUNCTIONAL_ASSESSMENT: 0-10

## 2024-07-18 NOTE — PROGRESS NOTES
[facility] Progress Note    Christine Horowitz is a 83 y.o. female on day 1 of admission presenting with Cerebrovascular accident (CVA), unspecified mechanism (Multi).  Subjective   Pt seen and examined again this afternoon. Resting in bed. No family present. States she feels good today, no further episodes or issues with dizziness. Denies any weakness, numbness, tingling, HA or difficulty speaking.     CTA head/neck with moderate focal stenosis of L M2 segment.   MRI s/p TNK pending for this afternoon.  TTE with normal EF, moderately dilated LA and negative bubble study.        Objective     Vitals:    07/18/24 1235 07/18/24 1240 07/18/24 1245 07/18/24 1250   BP: 157/66      BP Location:       Pulse: 70 65 63 61   Resp: 12 11 11 18   Temp:       TempSrc:       SpO2: 98% 95% 95% 96%   Weight:       Height:             Physical Exam  Vitals reviewed.   Eyes:      General: Lids are normal.      Extraocular Movements: Extraocular movements intact.      Pupils: Pupils are equal, round, and reactive to light.   Neurological:      Motor: Motor strength is normal.  Psychiatric:         Speech: Speech normal.       Neurological Exam  Mental Status  Awake, alert and oriented to person, place and time. Speech is normal. Language is fluent with no aphasia.    Cranial Nerves  CN II: Visual acuity is normal. Visual fields full to confrontation.  CN III, IV, VI: Extraocular movements intact bilaterally. Normal lids and orbits bilaterally. Pupils equal round and reactive to light bilaterally.  CN V: Facial sensation is normal.  CN VII: Full and symmetric facial movement.  CN VIII: Hearing is normal.  CN IX, X: Palate elevates symmetrically. Normal gag reflex.  CN XI: Shoulder shrug strength is normal.  CN XII: Tongue midline without atrophy or fasciculations.    Motor  Normal muscle bulk throughout. No fasciculations present. Normal muscle tone. No abnormal involuntary movements. Strength is 5/5 throughout all four  extremities.    Sensory  Light touch is normal in upper and lower extremities.     Coordination  Right: Finger-to-nose normal.Left: Finger-to-nose normal.      Scheduled medications  [Held by provider] aspirin, 81 mg, oral, Daily  atorvastatin, 80 mg, oral, Nightly  [Held by provider] enoxaparin, 40 mg, subcutaneous, q24h  FLUoxetine, 20 mg, oral, Daily  oxybutynin, 5 mg, oral, BID  perflutren lipid microspheres, 0.5-10 mL of dilution, intravenous, Once in imaging  perflutren protein A microsphere, 0.5 mL, intravenous, Once in imaging  polyethylene glycol, 17 g, oral, Daily  sulfur hexafluoride microsphr, 2 mL, intravenous, Once in imaging      Continuous medications     PRN medications  PRN medications: hydrALAZINE **FOLLOWED BY** [START ON 7/19/2024] hydrALAZINE, labetaloL, oxygen     Lab Results   Component Value Date    WBC 7.2 07/18/2024    RBC 4.43 07/18/2024    HGB 13.5 07/18/2024    HCT 40.1 07/18/2024     07/18/2024     07/18/2024    K 3.8 07/18/2024     07/18/2024    BUN 13 07/18/2024    CREATININE 0.74 07/18/2024    EGFR 80 07/18/2024    CALCIUM 9.0 07/18/2024    ALKPHOS 48 07/17/2024    AST 25 07/17/2024    ALT 17 07/17/2024    MG 2.18 07/18/2024    HGBA1C 5.9 (H) 07/17/2024    LDLCALC 98 07/17/2024    CHOL 195 07/17/2024    HDL 61.1 07/17/2024    TRIG 179 (H) 07/17/2024       Below CT and MRI images and reports have been personally reviewed in PACS, agree with interpretations.    Transthoracic Echo (TTE) Complete 07/18/2024    PHYSICIAN INTERPRETATION:  Left Ventricle: Left ventricular ejection fraction is normal, calculated by Escamilla's biplane at 63%. There are no regional wall motion abnormalities. The left ventricular cavity size is normal. Spectral Doppler shows a pseudonormal pattern of left ventricular diastolic filling.  Left Atrium: The left atrium is moderately dilated. A bubble study using agitated saline was performed. Bubble study is negative.  Right Ventricle: The right  ventricle is normal in size. There is normal right ventricular global systolic function.  Right Atrium: The right atrium is normal in size.  Aortic Valve: The aortic valve is trileaflet. There is mild aortic valve cusp calcification. The aortic valve dimensionless index is 0.67. There is no evidence of aortic valve regurgitation. The peak instantaneous gradient of the aortic valve is 8.3 mmHg. The mean gradient of the aortic valve is 5.0 mmHg.  Mitral Valve: The mitral valve is normal in structure. There is mild mitral valve regurgitation.  Tricuspid Valve: The tricuspid valve is structurally normal. There is mild tricuspid regurgitation. The Doppler estimated RVSP is mildly elevated at 40.9 mmHg.  Pulmonic Valve: The pulmonic valve is structurally normal. There is physiologic pulmonic valve regurgitation.  Pericardium: There is no pericardial effusion noted.  Aorta: The aortic root is normal.  Systemic Veins: The inferior vena cava appears to be of normal size. There is IVC inspiratory collapse greater than 50%.      CONCLUSIONS:  1. Left ventricular ejection fraction is normal, calculated by Escamilla's biplane at 63%.  2. Spectral Doppler shows a pseudonormal pattern of left ventricular diastolic filling.  3. There is normal right ventricular global systolic function.  4. The left atrium is moderately dilated.  5. Mildly elevated RVSP.      CT angio head and neck w and wo IV contrast 07/17/2024    Narrative  Interpreted By:  Brandy Salinas,  STUDY:  CT ANGIO HEAD AND NECK W AND WO IV CONTRAST;  7/17/2024 8:22 pm    INDICATION:  Signs/Symptoms:stroke    COMPARISON:  Correlation with noncontrast head CT earlier the same day    ACCESSION NUMBER(S):  [id number]    ORDERING CLINICIAN:  POLO ANTONY    TECHNIQUE:  Axial noncontrast images of the head were obtained. Following IV  contrast administration, a CT angiography of the head  and neck was  performed.  Triplanar MIPS  and 3D reconstructions of the [place]  of  [place]  and neck were generated.    FINDINGS:  NON-CONTRAST HEAD CT:    No acute intracranial hemorrhage, mass-effect, evidence of large  vessel ischemic infarct, or extra-axial fluid collection. Deep and  periventricular white matter hypodensities are nonspecific, but  favored to represent chronic small vessel ischemic changes.  Gray-white matter distinction is preserved.    No mass-effect, midline shift, sulcal effacement, or effacement of  the basal cisterns.    The brain parenchymal volume and ventricles are age-appropriate. No  hydrocephalus.    No acute skull fracture.  The visualized orbits  are intact.  The visualized paranasal sinuses show  no significant abnormality.  The mastoids are clear.      CTA NECK:    The great vessel origins are patent with no significant stenosis.    LEFT VERTEBRAL ARTERY:  No hemodynamically significant stenosis,  occlusion, or dissection.    LEFT COMMON/INTERNAL CAROTID ARTERY: Calcification of the bifurcation  and proximal external carotid artery no hemodynamically significant  stenosis, occlusion, or dissection.    RIGHT VERTEBRAL ARTERY:  No hemodynamically significant stenosis,  occlusion, or dissection.    RIGHT COMMON/INTERNAL CAROTID ARTERY: Mild calcification of the  carotid bifurcation.  No hemodynamically significant stenosis,  occlusion, or dissection.      The neck soft tissues show no evidence of mass, fluid collection, or  enlarged lymph nodes.    There is no acute osseous abnormality. Diffuse cervical degenerative  disc changes. The imaged lungs are clear.      CTA HEAD:    ANTERIOR CIRCULATION: No occlusion or aneurysm.    - Internal Carotid Arteries: Calcification of bilateral cavernous and  ophthalmic segments, resulting in mild stenosis.    - Middle Cerebral Arteries: There is focal stenosis of the left M2  inferior branch. No significant stenosis on the right    - Anterior Cerebral Arteries: Patent, with no hemodynamically  significant  stenosis.      POSTERIOR CIRCULATION: No occlusion or aneurysm.    - Intracranial Vertebral Arteries: Patent, with no hemodynamically  significant stenosis.    - Basilar Artery: Patent, with no hemodynamically significant  stenosis.    - Posterior Cerebral Arteries: Patent, with no hemodynamically  significant stenosis.    No arteriovenous malformation is visualized.  No pathologic intracranial enhancement or discrete mass.  The dural venous sinuses are patent.    MIPS and 3D reconstructions confirm the above findings.    Impression  1. No intracranial large vessel arterial branch occlusion. At least  moderate focal stenosis of the left MCA inferior M2 branch.    2. No significant stenosis of the cervical carotid or vertebral  arteries.        MACRO:  None    Signed by: Brandy Salinas 7/17/2024 9:25 PM  Dictation workstation:   [id number]      CT brain attack head wo IV contrast 07/17/2024    Narrative  Interpreted By:  Marianne Shields,  STUDY:  CT BRAIN ATTACK HEAD WO IV CONTRAST;  7/17/2024 3:21 pm    INDICATION:  Signs/Symptoms:Dizziness. + Test of skew and bilateral nonexaustable  with rotary nystagmus.    COMPARISON:  03/01/2013    ACCESSION NUMBER(S):  [id number]    ORDERING CLINICIAN:  CACHORRO RUIZ    TECHNIQUE:  Noncontrast axial CT scan of head was performed. Angled reformats in  brain and bone windows were generated. The images were reviewed in  bone, brain, blood and soft tissue windows.    FINDINGS:  CSF Spaces: The ventricles, sulci and basal cisterns are mildly  widened associated with diminished parenchymal volume but unchanged.  There is no extraaxial fluid collection.    Parenchyma: Periventricular hypodensities are unchanged and  compatible with chronic small-vessel ischemic changes. The grey-white  differentiation is intact. There is no mass effect or midline shift.  There is no intracranial hemorrhage.    Calvarium: The calvarium is unremarkable.    Paranasal sinuses and mastoids:  Visualized paranasal sinuses and  mastoids are clear.    Impression  No evidence of acute cortical infarct or intracranial hemorrhage.    No evidence of intracranial hemorrhage or displaced skull fracture.    MACRO:  Marianne Shields discussed the significance and urgency of this  critical finding by telephone with  CACHORRO RUIZ on  7/17/2024 at 3:29 pm.  (**-RCF-**) Findings:  See findings.      Signed by: Marianne Shields 7/17/2024 3:29 PM  Dictation workstation:   [id number]    I did review the images of the MRI of the brain and no acute stroke to my read was noted.  The official results are pending.    NIH Stroke Scale  1A. Level of Consciousness: Alert, Keenly Responsive  1B. Ask Month and Age: Both Questions Right  1C. Blink Eyes & Squeeze Hands: Performs Both Tasks  2. Best Gaze: Normal  3. Visual: No Visual Loss  4. Facial Palsy: Normal Symmetrical Movements  5A. Motor - Left Arm: No Drift  5B. Motor - Right Arm: No Drift  6A. Motor - Left Leg: No Drift  6B. Motor - Right Leg: No Drift  7. Limb Ataxia: Absent  8. Sensory Loss: Normal  9. Best Language: No Aphasia  10. Dysarthria: Normal  11. Extinction and Inattention: No Abnormality  NIH Stroke Scale: 0          Sharon Coma Scale  Best Eye Response: Spontaneous  Best Verbal Response: Oriented  Best Motor Response: Follows commands  [place] Coma Scale Score: 15        Assessment/Plan   This patient currently has cardiac telemetry ordered; if you would like to modify or discontinue the telemetry order, click here to go to the orders activity to modify/discontinue the order.  Principal Problem:    Cerebrovascular accident (CVA), unspecified mechanism (Multi)  Active Problems:    Hypercholesterolemia    Primary hypertension      IMPRESSION:  Christine Horowitz is a 83 y.o. female  with a history of HTN, mild CAD, and DLD presenting with dizziness. Neurology was consulted for stroke s/p TNK. On ASA 81 mg and statin PTA.  HCT unremarkable  CTA  head/neck with moderate focal stenosis of L M2 segment  Lipid panel with LDL 98, CHOL 195,   A1C 5.9%  TTE with normal EF, moderately dilated LA size, negative bubble study.     Suspected posterior stroke  S/p TNK  Dizziness, truncal ataxia, nystagmus - all resolved    RECOMMENDATIONS:  Continue supportive care.   MRI brain wo contrast 24 hours s/p TNK  Hold antiplatelet until after MRI, if no bleeding, will start DAPT of 81 mg aspirin and 75 mg plavix x 21 days then stop plavix and stay on 81 mg aspirin daily.   Continue HI statin (change from home simvastatin).   Holter monitor on discharge x 2 weeks, ordered.   Continue to monitor and manage stroke risks with PCP outpatient.     Discussed with patient, all questions answered.   Will follow for MRI results, if ok then can likely discharge from neuro standpoint  Follow up with myself in ~4 weeks outpatient.        I personally spent 55 minutes today, exclusive of procedures, providing care for this patient, including preparation, face to face time, documentation and other services such as review of medical records, diagnostic result, patient education, counseling, coordination of care as specified in the encounter.    Radha Iniguez, APRN-CNP    I agree with the above.  I saw and examined the patient with the NP and was present for the entirety of the clinical encounter.  The note above has been edited as appropriate.  The plan of care was mostly mine with input from the NP.    The patient is doing very well and feels back to her baseline.  The official reading of the MRI of the brain is pending and I will certainly check it.  To my read, there is no acute stroke or significant abnormality noted.  The patient is to continue all of her medicines and stroke risk factor modification.  The patient will need a cardiac monitor as an outpatient.  I discussed all these issues in detail with the patient and answered all her questions.  I will sign off for now.  The  patient can follow-up with neurology 1 month after discharge and with her primary care doctor in 2 weeks as an outpatient.

## 2024-07-18 NOTE — PROGRESS NOTES
Physical Therapy    Physical Therapy Evaluation    Patient Name: Christine Horowitz  MRN: [id number]  Today's Date: 7/18/2024  Start Time: 914  Stop Time: 947  Time Calculation (min): 33 min        07/07-A    Assessment/Plan   PT Assessment  PT Assessment Results: Decreased endurance, Impaired balance, Decreased strength  Rehab Prognosis: Excellent  Barriers to Discharge: none  Evaluation/Treatment Tolerance: Patient tolerated treatment well  Medical Staff Made Aware: Yes  Assessment Comment: Pt presents s/p stroke with symptoms of dizziness and ataxia resolving and not present today. Pt demonstrated decreased endurance and balance deficits on this date in which pt would benefit from skilled PT to address. Pt unlikely to need skilled PT upon discharge.  End of Session Patient Position: Bed, 3 rail up, Alarm on  IP OR SWING BED PT PLAN  Inpatient or Swing Bed: Inpatient  PT Plan  Treatment/Interventions: Transfer training, Gait training, Stair training, Balance training, Neuromuscular re-education, Strengthening, Endurance training, Therapeutic exercise, Therapeutic activity, Home exercise program  PT Plan: Ongoing PT  PT Frequency: 5 times per week  PT Discharge Recommendations: No PT needed after discharge  PT Recommended Transfer Status: Assist x1  PT - OK to Discharge: Yes (upon medical clearance)    All direct patient care supervised by licensed PT during this session      General Visit Information:  General  Reason for Referral: stroke  Referred By: Talisha  Past Medical History Relevant to Rehab: HTN, CAD, dislipidemia  Missed Visit: No  Family/Caregiver Present: Yes  Caregiver Feedback: pt son present for some of session- had no questions or concerns  Co-Treatment: OT  Co-Treatment Reason: to optimize pt safety and mobility  Prior to Session Communication: Bedside nurse  Patient Position Received: Bed, 3 rail up, Alarm on  General Comment: Pt seen in room 1007 with telemetry, agreeable to therapy    Home  Living:  Home Living  Type of Home: House  Lives With: Alone  Home Adaptive Equipment: Walker rolling or standard  Home Layout: One level  Home Access: Stairs to enter with rails  Entrance Stairs-Number of Steps: 2  Bathroom Shower/Tub: Walk-in shower  Bathroom Equipment: Shower chair with back  Home Living Comments: laundry located in basement    Prior Level of Function:  Prior Function Per Pt/Caregiver Report  Level of [place]: Independent with ADLs and functional transfers, Independent with homemaking with ambulation  Receives Help From: Family  ADL Assistance: Independent  Homemaking Assistance: Independent  Ambulatory Assistance: Independent  Leisure: enjoys swimming classes at [employer]  Prior Function Comments: Pt driving and able to complete all IADLs and ADLs without assistance. Ambulatory without device    Precautions:  Precautions  Precautions Comment: fall ris, s/p TNK    Vital Signs:  Vital Signs  Heart Rate: 83  Heart Rate Source: Monitor  SpO2: 98 %  BP: 178/85 (171/105 sitting in chair prior to gait trial)  BP Location: Left arm  BP Method: Automatic  Patient Position:  (after 250ft gait)  Objective     Pain:  Pain Assessment  Pain Assessment: 0-10  0-10 (Numeric) Pain Score: 0 - No pain    Cognition:  Cognition  Overall Cognitive Status: Within Functional Limits  Orientation Level: Oriented X4    General Assessments:  General Observation  General Observation: Pt presents in pleasant demenor and motivated to participate in therapy. Pt percieves she is moving and feeling at her normal baseline. Pt reports baseline balance deficits and pt was unable to maintain semi tandem stance more than 5 sec prior to stepping reaction needed. Pt with lack of hip strategies utilized for balance maintenance. Pt able to ambulate 250 ft without assist for stability and noted slight light headedness with fatigue upon completion. Vitals remained stable throughout. Pt wished to return to bed upon completion of  "session.   Activity Tolerance  Endurance: Tolerates 10 - 20 min exercise with multiple rests  Sensation  Light Touch: No apparent deficits  Strength  Strength Comments: BLE grossly 4/5  Perception  Inattention/Neglect: Appears intact  Initiation: Appears intact  Motor Planning: Appears intact  Perseveration: Not present  Coordination  Movements are Fluid and Coordinated: Yes  Finger to Nose: Intact  Rapid Alternating Movements: Intact  Alternating Toe Taps: Intact  Heel to [place]: Intact  Finger to [place]: Intact  Postural Control  Postural Control: Within Functional Limits  Static Sitting Balance  Static Sitting-Balance Support: Feet supported  Static Sitting-Level of Assistance: Distant supervision  Static Sitting-Comment/Number of Minutes: pt able to sit EOB ~2 min without assistance  Static Standing Balance  Static Standing-Balance Support: No upper extremity supported  Static Standing-Level of Assistance: Close supervision  Static Standing-Comment/Number of Minutes: pt able to stand ~ 30 sec without support and showing stability  Dynamic Standing Balance  Dynamic Standing-Balance Support: No upper extremity supported  Dynamic Standing-Balance: Staggered stance (Semi-tandem stance)  Dynamic Standing-Comments: in semi tandem stance pt able to maintain balance 5 sec prior to min A needed to recover with use of stepping strategy; pt reports \"bad balance\" at baseline and has \"never been able to walk heel to toe\"    Functional Assessments:     Bed Mobility  Bed Mobility: Yes (supine <> sit with SBA)  Transfers  Transfer: Yes (sit <> stand with SBA and no device; ambulatory transfer to chair with SBA)  Ambulation/Gait Training  Ambulation/Gait Training Performed: Yes (250ft gait with CGA-SBA (no device use) with RPE of 4/10 upon completion)  Stairs  Stairs: No       Extremity/Trunk Assessments:  RUE   RUE : Within Functional Limits  LUE   LUE: Within Functional Limits  RLE   RLE : Within Functional Limits  LLE   LLE : " Within Functional Limits    Outcome Measures:     [facility] Basic Mobility  Turning from your back to your side while in a flat bed without using bedrails: None  Moving from lying on your back to sitting on the side of a flat bed without using bedrails: None  Moving to and from bed to chair (including a wheelchair): None  Standing up from a chair using your arms (e.g. wheelchair or bedside chair): None  To walk in hospital room: A little  Climbing 3-5 steps with railing: A little  Basic Mobility - Total Score: 22                                        Goals:  Encounter Problems       Encounter Problems (Active)       PT Problem       Gait       Start:  07/18/24    Expected End:  08/01/24       Pt will be able to ambulate 500 ft without device use and no greater than SBA without symptoms of dizziness in order to progress towards return to community ambulation.         Stairs       Start:  07/18/24    Expected End:  08/01/24       Pt will be able to navigate 3 stairs with YURY handrails with no greater than SBA in order to be able to discharge safely to home environment.         Strengthening       Start:  07/18/24    Expected End:  08/01/24       Pt will participate in 20+ reps of BLE strengthening activities to improve strength and endurance to decrease assistance needed upon discharge.              Pain - Adult            Education Documentation  Mobility Training, taught by KRISTI Corona at 7/18/2024 11:18 AM.  Learner: Patient  Readiness: Acceptance  Method: Explanation  Response: Verbalizes Understanding    Education Comments  No comments found.        KRISTI CORONA

## 2024-07-18 NOTE — PROGRESS NOTES
Social work consult placed for discharge planning. SW reviewed pt's chart and communicated with TCC. No SW needs foreseen at this time. SW signing off; available upon request.    Herber James, MSW, LSW ([id number])   Care Transitions

## 2024-07-18 NOTE — PROGRESS NOTES
Christine Horowitz is a 83 y.o. female on day 1 of admission presenting with Cerebrovascular accident (CVA), unspecified mechanism (Multi).      Subjective   Christine Horowitz is a 83 y.o. female with past medical history of hypertension, mild coronary artery disease, hyperlipidemia, mitral valve prolapse, depression, and OAB presented to [facility] ED complaints of severe dizziness, nausea, and vomiting starting 30 minutes prior to arrival.  Upon ED workup, patient was noted to have nystagmus and ataxia.  Her initial vital signs on presentation were temperature 36.7 °C, heart rate 79, respiratory rate 20, blood pressure 146/72, and SpO2 100% on room air.  ED labs revealed blood glucose 122, sodium 135, bicarbonate 19, APTT 24, , and remainder of CMP and CBC unremarkable.  Stroke alert was activated and patient received CT head under brain attack which did not demonstrate any acute pathology.  ED provider coordinated with [facility] [place] about concerns for stroke and TNK was recommended and given.  ED interventions included Reglan IV x 1, Benadryl IV x 1, 1 L IV fluid bolus, TNK administration, and neurology consult.     Patient seen and examined in ICU bed 15.  Patient alert and oriented x 4 and in no acute distress.  Patient stated that she was at home talking on the phone with her granddaughter when she suddenly became very dizzy, having feelings of being hot and cold, and seeing double chairs.  Patient stated that she got off the phone with her granddaughter because she was not feeling well and tried closing her eyes to see if that would help with the dizziness and did not have any improvement in symptoms so she called EMS.  Patient denies any dizziness or double vision/blurred vision at this time.  Patient states she feels 100% better.  She denies any syncopal episodes, chest pain, cough, shortness of breath, blurred/double vision, nausea, vomiting, diarrhea, abdominal pain, hematuria,  hematochezia, hematemesis, paresthesias, or any sick contacts.  NIHSS score 0.  Patient denies any tobacco use or drug use.  She states that she occasionally drinks a beer after doing yard work.  She denies having any episodes like this in the past.  Critical care will follow and likely will sign off tomorrow afternoon pending MRI.  7/18: Patient was seen and examined.  She's awake and alert and interactive. Participated in PT/OT adequately. Status post TNK yesterday and procedure well-tolerated vitals remained stable today.  Post TNK repeat CT/MRI later today after 24 hours.  HbA1c is 5.8.  Lipid panel showed slightly elevated triglyceride at 179.  Neurologist and intensivist on board and recommendations appreciated     Objective     Last Recorded Vitals  /73   Pulse 70   Temp 36.6 °C (97.8 °F) (Temporal)   Resp (!) 9   Wt 78.4 kg (172 lb 13.5 oz)   SpO2 97%   Intake/Output last 3 Shifts:    Intake/Output Summary (Last 24 hours) at 7/18/2024 1035  Last data filed at 7/17/2024 1558  Gross per 24 hour   Intake 1000 ml   Output --   Net 1000 ml       Admission Weight  Weight: 68 kg (150 lb) (07/17/24 1452)    Daily Weight  07/17/24 : 78.4 kg (172 lb 13.5 oz)    Image Results  ECG 12 lead  Sinus rhythm  Ventricular premature complex  Prolonged AK interval  Repol abnrm suggests ischemia, lateral leads    See ED provider note for full interpretation and clinical correlation  Confirmed by Kiera Phelps ([id number]) on 7/18/2024 10:29:46 AM      Physical Exam  Constitutional:       General: She is not in acute distress.  HENT:      Head: Normocephalic and atraumatic.      Right Ear: External ear normal.      Left Ear: External ear normal.      Nose: Nose normal.      Mouth/Throat:      Mouth: Mucous membranes are moist.      Pharynx: Oropharynx is clear.   Eyes:      General: No scleral icterus.     Extraocular Movements: Extraocular movements intact.      Conjunctiva/sclera: Conjunctivae normal.      Pupils: Pupils  are equal, round, and reactive to light.   Cardiovascular:      Rate and Rhythm: Normal rate and regular rhythm.      Pulses: Normal pulses.      Heart sounds: Normal heart sounds. No murmur heard.  Pulmonary:      Effort: Pulmonary effort is normal. No respiratory distress.      Breath sounds: Normal breath sounds. No wheezing, rhonchi or rales.   Chest:      Chest wall: No tenderness.   Abdominal:      General: Bowel sounds are normal. There is no distension.      Palpations: Abdomen is soft. There is no mass.      Tenderness: There is no abdominal tenderness. There is no rebound.   Musculoskeletal:         General: No swelling or deformity. Normal range of motion.      Cervical back: Normal range of motion.      Right lower leg: No edema.      Left lower leg: No edema.   Skin:     General: Skin is warm and dry.      Capillary Refill: Capillary refill takes less than 2 seconds.   Neurological:      General: No focal deficit present.      Mental Status: She is alert and oriented to person, place, and time.      Cranial Nerves: No cranial nerve deficit.      Sensory: No sensory deficit.      Motor: No weakness.      Coordination: Coordination normal.   Psychiatric:         Mood and Affect: Mood normal.         Behavior: Behavior normal.   Relevant Results               Assessment/Plan   This patient currently has cardiac telemetry ordered; if you would like to modify or discontinue the telemetry order, click here to go to the orders activity to modify/discontinue the order.              Principal Problem:    Cerebrovascular accident (CVA), unspecified mechanism (Multi)  Active Problems:    Hypercholesterolemia    Primary hypertension      Acute CVA status post thrombolysis  -Initial CT head negative for intracranial hemorrhage  -Received TNK at 1542 on 7/17/2024  -Lipid panel results: Total cholesterol 195, triglycerides 179, LDL 98, HDL 61.1  -CTA head and neck with and without IV contrast reviewed  -Resolution of  symptoms after TNK  -Continuous telemetry monitoring  -Continue neurochecks per protocol post TNK  -Avoid invasive procedures as able  -MRI brain 24 hours post TNK  -Continue high intensity statin  -Hold all antiplatelets and anticoagulation for at least 24 hours post TNK  -SCDs for DVT prophylaxis  -TTE with bubble study pending  -PT/OT evaluation  -Neurologist and intensivist on board     History of irregular heart beat  Monitor telemetry  No hx of afib may need event monitor as outpt     Hyperlipidemia  Check lipid panel, aggressively lower LDL to 70 or better     HTN  Home meds on hold, continue prn per protocol      Spent 35 mins for the follow up managment    Celeste Arce MD

## 2024-07-18 NOTE — PROGRESS NOTES
07/18/24 1000   Discharge Planning   Living Arrangements Alone   Support Systems Children;Family members   Assistance Needed independnet in all- mows 1 acre grass self, walk in shower with seat and grab bars, denies falls.   Type of Residence Private residence   Number of Stairs to Enter Residence 2   Number of Stairs Within Residence 14  (basement laundry- bilateral railings)   Do you have animals or pets at home? No   Who is requesting discharge planning? Provider   Home or Post Acute Services None   Expected Discharge Disposition Home   Does the patient need discharge transport arranged? No   Financial Resource Strain   How hard is it for you to pay for the very basics like food, housing, medical care, and heating? Not hard   Housing Stability   In the last 12 months, was there a time when you were not able to pay the mortgage or rent on time? N   In the past 12 months, how many times have you moved where you were living? 1   At any time in the past 12 months, were you homeless or living in a shelter (including now)? N   Transportation Needs   In the past 12 months, has lack of transportation kept you from medical appointments or from getting medications? no   In the past 12 months, has lack of transportation kept you from meetings, work, or from getting things needed for daily living? No     Spoke with pt, role of TCC explained, demographics confirmed. PCP- - yearly visit due again in October. Sees cardiology Q6months. Pharmacy- [facility]- takes meds as prescribed, able to afford and obtain. Pt lives alone, is independent without falls or DME. Home is set up for handicap due to  had parkinson's. Pt navigates basement stairs for laundry well. Therapy steps into room during assessment to let pt know that PT/OT will continue while pt in hospital to ensure pt keeps moving. [facility] 24.  Per notes pt may need halter monitor as out pt. Plan- home no needs. CT will follow.

## 2024-07-18 NOTE — PROGRESS NOTES
Spiritual Care Visit    Clinical Encounter Type  Visited With: Patient  Routine Visit: Introduction    Yarsanism Encounters  Yarsanism Needs: Prayer

## 2024-07-18 NOTE — PROGRESS NOTES
Occupational Therapy  Evaluation    Patient Name: Christine Horowitz  MRN: [id number]  Today's Date: 7/18/2024  Time Calculation  Start Time: 0911  Stop Time: 0929  Time Calculation (min): 18 min    Current Problem:   1. Cerebrovascular accident (CVA), unspecified mechanism (Multi)    2. PFO (patent foramen ovale) ([facility])        OT order: OT eval and treat   Referred by: Talisha  Reason for referral: stroke rule out. pt presenting with nausea, dizziness, ataxia and off balance. post TNK. MRI pending.  Past medical history related to rehab: HTN, CAD, dislipidemia    Precautions:   Hearing/Visual Limitations: intact  Medical Precautions: No known precautions/limitation  Precautions Comment: s/p TNK    ASSESSMENT  OT Assessment: OT eval completed. The patient is functioning very close to baseline for self care. no functional deficits and pt reports stroke symptoms have resolved. no further skilled OT needs at this time, DC..    Prognosis:    Barriers to discharge: None  Tolerance:      PLAN  Frequency: OT eval only  Treatment Interventions:    Discharge Recommendations: No further acute OT, No OT needed after discharge  OT OK to discharge: Yes    GENERAL VISIT INFORMATION   Start of session communication: Bedside nurse  End of session communication: Bedside nurse  Family/caregiver present: Yes  Caregiver feedback: son present  Co-Treatment: PT  Reason for co-treatment: to optimize safety and mobility, while focusing on discipline specific goals   Position Pt Received:  Bed, 3 rail up, Alarm off, not on at start of session  End of session position: Up in chair, Alarm off, not on at start of session    SUBJECTIVE  Home Living:  Type of Home: House  Lives With: Alone  Home Adaptive Equipment: Walker rolling or standard  Home Layout: One level, Laundry in basement  Home Access: Stairs to enter with rails  Entrance Stairs-Number of Steps: 2  Bathroom Shower/Tub: Walk-in shower     Prior Level of Function:  Receives Help  From: Family  ADL Assistance: Independent  Homemaking Assistance: Independent  Ambulatory Assistance: Independent  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[employer])  Leisure: swim classes a rec center, reading  Hand Dominance: Right  Prior Function Comments: +drives    IADL History:       Pain:  Assessment: 0-10  Score: 0 - No pain  Type:    Location:    Interventions:    Response to pain interventions:      OBJECTIVE  Vital Signs:       Cognition:  Overall Cognitive Status: Within Functional Limits  Orientation Level: Oriented X4  Following Commands: Follows all commands and directions without difficulty  Safety Judgment: Good awareness of safety precautions  Processing Speed: Within funtional limits             Current ADL function:   EATING:  Independent     GROOMING: Independent     BATHING: Minimal     UB DRESSING: Stand by     LB DRESSING: Stand by Don/doff R sock, Don/doff L sock   TOILETING: Stand by    ADL comments:       Activity Tolerance:  Endurance: Endurance does not limit participation in activity    Bed Mobility/Transfers:   Bed Mobility  Bed Mobility: Yes  Bed Mobility 1  Bed Mobility 1: Supine to sitting  Level of Assistance 1: Independent  Transfers  Transfer:  (sit<>stand SBAx1)    Ambulation/Gait Training:  Functional Mobility  Functional Mobility Performed:  (pt performed functional mobility around room with SBAx1. no LOB. no safety concerns)    Sitting Balance:  Static Sitting Balance  Static Sitting-Level of Assistance: Independent  Dynamic Sitting Balance  Dynamic Sitting-Comments: supervision    Standing Balance:  Static Standing Balance  Static Standing-Comment/Number of Minutes: supervision  Dynamic Standing Balance  Dynamic Standing-Comments: supervision    Vision: Vision - Basic Assessment  Current Vision: No visual deficits   and Vision - Complex Assessment  Ocular Range of Motion: Within Functional Limits  Head Position: Upright, centered, looking straight ahead, not  leaning any direction  Tracking: WFL  Visual Fields: WFL  Acuity: Able to read clock/calendar on wall without difficulty    Sensation:  Light Touch: No apparent deficits    Strength:  Strength Comments: R 4/5, L 4-/5    Perception:  Inattention/Neglect: Appears intact  Initiation: Appears intact  Motor Planning: Appears intact  Perseveration: Not present    Coordination:  Movements are Fluid and Coordinated: Yes  Finger to Nose: Intact  Rapid Alternating Movements: Intact     Hand Function:  Hand Function  Gross Grasp: Functional    Extremities: RUE   RUE : Within Functional Limits and LUE   LUE: Within Functional Limits    Outcome Measures: [facility] Daily Activity  Putting on and taking off regular lower body clothing: A little  Bathing (including washing, rinsing, drying): A little  Putting on and taking off regular upper body clothing: A little  Toileting, which includes using toilet, bedpan or urinal: A little  Taking care of personal grooming such as brushing teeth: None  Eating Meals: None  Daily Activity - Total Score: 20                    EDUCATION:     Education Documentation  ADL Training, taught by Corazon Brody OT at 7/18/2024  1:15 PM.  Learner: Patient  Readiness: Eager  Method: Explanation  Response: Verbalizes Understanding    Education Comments  No comments found.

## 2024-07-18 NOTE — PROGRESS NOTES
Critical Care Daily Progress        Subjective   Patient is a 83 y.o. female admitted on 7/17/2024  2:52 PM after tenecteplase administration for stroke.     Interval History: Patient has had resolution of her neurological symptoms.  Reports she is back to baseline.     Complaints: Denies any form of bleeding, such as melena.  Denies chest pain or abdominal pain.  Resting comfortably on room air.    Scheduled Medications:   [Held by provider] aspirin, 81 mg, oral, Daily  atorvastatin, 80 mg, oral, Nightly  [Held by provider] enoxaparin, 40 mg, subcutaneous, q24h  FLUoxetine, 20 mg, oral, Daily  oxybutynin, 5 mg, oral, BID  perflutren lipid microspheres, 0.5-10 mL of dilution, intravenous, Once in imaging  perflutren protein A microsphere, 0.5 mL, intravenous, Once in imaging  polyethylene glycol, 17 g, oral, Daily  sulfur hexafluoride microsphr, 2 mL, intravenous, Once in imaging         Continuous Medications:         PRN Medications:   PRN medications: hydrALAZINE **FOLLOWED BY** [START ON 7/19/2024] hydrALAZINE, labetaloL, oxygen    Objective   Vitals:  Most Recent:  Vitals:    07/18/24 0745   BP:    Pulse: 71   Resp: 13   Temp:    SpO2: 98%       24hr Min/Max:  Temp  Min: 35.9 °C (96.7 °F)  Max: 36.7 °C (98.1 °F)  Pulse  Min: 57  Max: 96  BP  Min: 111/45  Max: 179/90  Resp  Min: 9  Max: 32  SpO2  Min: 91 %  Max: 100 %        I/O:    Intake/Output Summary (Last 24 hours) at 7/18/2024 0822  Last data filed at 7/17/2024 1558  Gross per 24 hour   Intake 1000 ml   Output --   Net 1000 ml       Physical Exam:   Physical Exam  Constitutional:       General: She is not in acute distress.     Appearance: Normal appearance.   HENT:      Head: Normocephalic and atraumatic.   Eyes:      General: No scleral icterus.  Cardiovascular:      Rate and Rhythm: Normal rate and regular rhythm.   Pulmonary:      Effort: Pulmonary effort is normal. No respiratory distress.   Abdominal:      Palpations: Abdomen is soft.    Musculoskeletal:      Cervical back: Neck supple. No rigidity.      Right lower leg: No edema.      Left lower leg: No edema.   Skin:     Findings: No rash.   Neurological:      Mental Status: She is alert and oriented to person, place, and time.          Lab/Radiology/Diagnostic Review:  Results for orders placed or performed during the hospital encounter of 07/17/24 (from the past 24 hour(s))   CBC and Auto Differential   Result Value Ref Range    WBC 7.6 4.4 - 11.3 x10*3/uL    nRBC 0.0 0.0 - 0.0 /100 WBCs    RBC 4.46 4.00 - 5.20 x10*6/uL    Hemoglobin 13.9 12.0 - 16.0 g/dL    Hematocrit 39.2 36.0 - 46.0 %    MCV 88 80 - 100 fL    MCH 31.2 26.0 - 34.0 pg    MCHC 35.5 32.0 - 36.0 g/dL    RDW 12.9 11.5 - 14.5 %    Platelets 226 150 - 450 x10*3/uL    Neutrophils % 53.7 40.0 - 80.0 %    Immature Granulocytes %, Automated 0.3 0.0 - 0.9 %    Lymphocytes % 36.1 13.0 - 44.0 %    Monocytes % 8.3 2.0 - 10.0 %    Eosinophils % 1.2 0.0 - 6.0 %    Basophils % 0.4 0.0 - 2.0 %    Neutrophils Absolute 4.09 1.60 - 5.50 x10*3/uL    Immature Granulocytes Absolute, Automated 0.02 0.00 - 0.50 x10*3/uL    Lymphocytes Absolute 2.74 0.80 - 3.00 x10*3/uL    Monocytes Absolute 0.63 0.05 - 0.80 x10*3/uL    Eosinophils Absolute 0.09 0.00 - 0.40 x10*3/uL    Basophils Absolute 0.03 0.00 - 0.10 x10*3/uL   Comprehensive metabolic panel   Result Value Ref Range    Glucose 122 (H) 74 - 99 mg/dL    Sodium 135 (L) 136 - 145 mmol/L    Potassium 3.6 3.5 - 5.3 mmol/L    Chloride 102 98 - 107 mmol/L    Bicarbonate 19 (L) 21 - 32 mmol/L    Anion Gap 18 10 - 20 mmol/L    Urea Nitrogen 18 6 - 23 mg/dL    Creatinine 0.93 0.50 - 1.05 mg/dL    eGFR 61 >60 mL/min/1.73m*2    Calcium 9.4 8.6 - 10.3 mg/dL    Albumin 4.2 3.4 - 5.0 g/dL    Alkaline Phosphatase 48 33 - 136 U/L    Total Protein 6.9 6.4 - 8.2 g/dL    AST 25 9 - 39 U/L    Bilirubin, Total 0.8 0.0 - 1.2 mg/dL    ALT 17 7 - 45 U/L   Troponin I, High Sensitivity   Result Value Ref Range    Troponin I,  High Sensitivity 10 0 - 13 ng/L   Protime-INR   Result Value Ref Range    Protime 11.2 9.8 - 12.8 seconds    INR 1.0 0.9 - 1.1   APTT   Result Value Ref Range    aPTT 24 (L) 27 - 38 seconds   ECG 12 lead   Result Value Ref Range    Ventricular Rate 72 BPM    Atrial Rate 72 BPM    CA Interval 223 ms    QRS Duration 104 ms    QT Interval 455 ms    QTC Calculation(Bazett) 499 ms    P Axis 112 degrees    R Axis 63 degrees    T Axis 101 degrees    QRS Count 11 beats    Q Onset 249 ms    T Offset 477 ms    QTC Fredericia 483 ms   POCT GLUCOSE   Result Value Ref Range    POCT Glucose 133 (H) 74 - 99 mg/dL   Lipid Panel   Result Value Ref Range    Cholesterol 195 0 - 199 mg/dL    HDL-Cholesterol 61.1 mg/dL    Cholesterol/HDL Ratio 3.2     LDL Calculated 98 <=99 mg/dL    VLDL 36 0 - 40 mg/dL    Triglycerides 179 (H) 0 - 149 mg/dL    Non HDL Cholesterol 134 0 - 149 mg/dL   B-Type Natriuretic Peptide   Result Value Ref Range     (H) 0 - 99 pg/mL   POCT GLUCOSE   Result Value Ref Range    POCT Glucose 112 (H) 74 - 99 mg/dL   Basic Metabolic Panel   Result Value Ref Range    Glucose 113 (H) 74 - 99 mg/dL    Sodium 138 136 - 145 mmol/L    Potassium 3.8 3.5 - 5.3 mmol/L    Chloride 106 98 - 107 mmol/L    Bicarbonate 24 21 - 32 mmol/L    Anion Gap 12 10 - 20 mmol/L    Urea Nitrogen 13 6 - 23 mg/dL    Creatinine 0.74 0.50 - 1.05 mg/dL    eGFR 80 >60 mL/min/1.73m*2    Calcium 9.0 8.6 - 10.3 mg/dL   CBC and Auto Differential   Result Value Ref Range    WBC 7.2 4.4 - 11.3 x10*3/uL    nRBC 0.0 0.0 - 0.0 /100 WBCs    RBC 4.43 4.00 - 5.20 x10*6/uL    Hemoglobin 13.5 12.0 - 16.0 g/dL    Hematocrit 40.1 36.0 - 46.0 %    MCV 91 80 - 100 fL    MCH 30.5 26.0 - 34.0 pg    MCHC 33.7 32.0 - 36.0 g/dL    RDW 13.2 11.5 - 14.5 %    Platelets 235 150 - 450 x10*3/uL    Neutrophils % 63.4 40.0 - 80.0 %    Immature Granulocytes %, Automated 0.3 0.0 - 0.9 %    Lymphocytes % 27.2 13.0 - 44.0 %    Monocytes % 7.7 2.0 - 10.0 %    Eosinophils % 1.1  0.0 - 6.0 %    Basophils % 0.3 0.0 - 2.0 %    Neutrophils Absolute 4.55 1.60 - 5.50 x10*3/uL    Immature Granulocytes Absolute, Automated 0.02 0.00 - 0.50 x10*3/uL    Lymphocytes Absolute 1.95 0.80 - 3.00 x10*3/uL    Monocytes Absolute 0.55 0.05 - 0.80 x10*3/uL    Eosinophils Absolute 0.08 0.00 - 0.40 x10*3/uL    Basophils Absolute 0.02 0.00 - 0.10 x10*3/uL   Magnesium   Result Value Ref Range    Magnesium 2.18 1.60 - 2.40 mg/dL   Phosphorus   Result Value Ref Range    Phosphorus 3.1 2.5 - 4.9 mg/dL   POCT GLUCOSE   Result Value Ref Range    POCT Glucose 103 (H) 74 - 99 mg/dL     ECG 12 lead    Result Date: 7/18/2024  Sinus rhythm Ventricular premature complex Prolonged AK interval Repol abnrm suggests ischemia, lateral leads    CT angio head and neck w and wo IV contrast    Result Date: 7/17/2024  Interpreted By:  Brandy Salinas, STUDY: CT ANGIO HEAD AND NECK W AND WO IV CONTRAST;  7/17/2024 8:22 pm   INDICATION: Signs/Symptoms:stroke   COMPARISON: Correlation with noncontrast head CT earlier the same day   ACCESSION NUMBER(S): [id number]   ORDERING CLINICIAN: POLO ANTONY   TECHNIQUE: Axial noncontrast images of the head were obtained. Following IV contrast administration, a CT angiography of the head  and neck was performed.  Triplanar MIPS  and 3D reconstructions of the [place]  and neck were generated.   FINDINGS: NON-CONTRAST HEAD CT:   No acute intracranial hemorrhage, mass-effect, evidence of large vessel ischemic infarct, or extra-axial fluid collection. Deep and periventricular white matter hypodensities are nonspecific, but favored to represent chronic small vessel ischemic changes. Gray-white matter distinction is preserved.   No mass-effect, midline shift, sulcal effacement, or effacement of the basal cisterns.   The brain parenchymal volume and ventricles are age-appropriate. No hydrocephalus.   No acute skull fracture. The visualized orbits  are intact. The visualized paranasal sinuses  show  no significant abnormality. The mastoids are clear.     CTA NECK:   The great vessel origins are patent with no significant stenosis.   LEFT VERTEBRAL ARTERY:  No hemodynamically significant stenosis, occlusion, or dissection.   LEFT COMMON/INTERNAL CAROTID ARTERY: Calcification of the bifurcation and proximal external carotid artery no hemodynamically significant stenosis, occlusion, or dissection.   RIGHT VERTEBRAL ARTERY:  No hemodynamically significant stenosis, occlusion, or dissection.   RIGHT COMMON/INTERNAL CAROTID ARTERY: Mild calcification of the carotid bifurcation.  No hemodynamically significant stenosis, occlusion, or dissection.     The neck soft tissues show no evidence of mass, fluid collection, or enlarged lymph nodes.   There is no acute osseous abnormality. Diffuse cervical degenerative disc changes. The imaged lungs are clear.     CTA HEAD:   ANTERIOR CIRCULATION: No occlusion or aneurysm.   - Internal Carotid Arteries: Calcification of bilateral cavernous and ophthalmic segments, resulting in mild stenosis.   - Middle Cerebral Arteries: There is focal stenosis of the left M2 inferior branch. No significant stenosis on the right   - Anterior Cerebral Arteries: Patent, with no hemodynamically significant stenosis.     POSTERIOR CIRCULATION: No occlusion or aneurysm.   - Intracranial Vertebral Arteries: Patent, with no hemodynamically significant stenosis.   - Basilar Artery: Patent, with no hemodynamically significant stenosis.   - Posterior Cerebral Arteries: Patent, with no hemodynamically significant stenosis.   No arteriovenous malformation is visualized. No pathologic intracranial enhancement or discrete mass. The dural venous sinuses are patent.   MIPS and 3D reconstructions confirm the above findings.       1. No intracranial large vessel arterial branch occlusion. At least moderate focal stenosis of the left MCA inferior M2 branch.   2. No significant stenosis of the cervical  carotid or vertebral arteries.       MACRO: None   Signed by: Brandy Salinas 7/17/2024 9:25 PM Dictation workstation:   [id number]    CT brain attack head wo IV contrast    Result Date: 7/17/2024  Interpreted By:  Marianne Shields, STUDY: CT BRAIN ATTACK HEAD WO IV CONTRAST;  7/17/2024 3:21 pm   INDICATION: Signs/Symptoms:Dizziness. + Test of skew and bilateral nonexaustable with rotary nystagmus.   COMPARISON: 03/01/2013   ACCESSION NUMBER(S): [id number]   ORDERING CLINICIAN: CACHORRO RUIZ   TECHNIQUE: Noncontrast axial CT scan of head was performed. Angled reformats in brain and bone windows were generated. The images were reviewed in bone, brain, blood and soft tissue windows.   FINDINGS: CSF Spaces: The ventricles, sulci and basal cisterns are mildly widened associated with diminished parenchymal volume but unchanged. There is no extraaxial fluid collection.   Parenchyma: Periventricular hypodensities are unchanged and compatible with chronic small-vessel ischemic changes. The grey-white differentiation is intact. There is no mass effect or midline shift. There is no intracranial hemorrhage.   Calvarium: The calvarium is unremarkable.   Paranasal sinuses and mastoids: Visualized paranasal sinuses and mastoids are clear.       No evidence of acute cortical infarct or intracranial hemorrhage.   No evidence of intracranial hemorrhage or displaced skull fracture.   MACRO: Marianne Shields discussed the significance and urgency of this critical finding by telephone with  CACHORRO RUIZ on 7/17/2024 at 3:29 pm.  (**-RCF-**) Findings:  See findings.     Signed by: Marianne Shields 7/17/2024 3:29 PM Dictation workstation:   [id number]                     Assessment/Plan   Principal Problem:    Cerebrovascular accident (CVA), unspecified mechanism (Multi)  Active Problems:    Hypercholesterolemia    Primary hypertension    Status post tenecteplase administration.  Critical care medicine consulted per  protocol, ostensibly to evaluate and manage any potential bleeding complications of which there have been none.  Patient can transfer out of the intensive care unit 24 hours after tenecteplase administration per protocol.  Critical care medicine service will sign off at the present time.  Please reconsult if patient's condition changes.    Dragon dictation software was used to dictate this note and thus there may be minor errors in translation/transcription including garbled speech or misspellings. Please contact for clarification if needed.     Juancho Stahl MD

## 2024-07-19 ENCOUNTER — PHARMACY VISIT (OUTPATIENT)
Dept: PHARMACY | Facility: CLINIC | Age: 84
End: 2024-07-19
Payer: COMMERCIAL

## 2024-07-19 ENCOUNTER — ANCILLARY PROCEDURE (OUTPATIENT)
Dept: CARDIOLOGY | Facility: CLINIC | Age: 84
End: 2024-07-19
Payer: MEDICARE

## 2024-07-19 VITALS
SYSTOLIC BLOOD PRESSURE: 139 MMHG | TEMPERATURE: 97.2 F | RESPIRATION RATE: 19 BRPM | HEIGHT: 66 IN | HEART RATE: 95 BPM | BODY MASS INDEX: 27.78 KG/M2 | OXYGEN SATURATION: 100 % | DIASTOLIC BLOOD PRESSURE: 74 MMHG | WEIGHT: 172.84 LBS

## 2024-07-19 DIAGNOSIS — I63.9 CEREBROVASCULAR ACCIDENT (CVA), UNSPECIFIED MECHANISM (MULTI): ICD-10-CM

## 2024-07-19 DIAGNOSIS — R42 DIZZINESS AND GIDDINESS: ICD-10-CM

## 2024-07-19 LAB
ALBUMIN SERPL BCP-MCNC: 3.8 G/DL (ref 3.4–5)
ANION GAP SERPL CALC-SCNC: 11 MMOL/L (ref 10–20)
BUN SERPL-MCNC: 17 MG/DL (ref 6–23)
CALCIUM SERPL-MCNC: 8.8 MG/DL (ref 8.6–10.3)
CHLORIDE SERPL-SCNC: 105 MMOL/L (ref 98–107)
CO2 SERPL-SCNC: 24 MMOL/L (ref 21–32)
CREAT SERPL-MCNC: 0.75 MG/DL (ref 0.5–1.05)
EGFRCR SERPLBLD CKD-EPI 2021: 79 ML/MIN/1.73M*2
ERYTHROCYTE [DISTWIDTH] IN BLOOD BY AUTOMATED COUNT: 13.2 % (ref 11.5–14.5)
GLUCOSE BLD MANUAL STRIP-MCNC: 100 MG/DL (ref 74–99)
GLUCOSE BLD MANUAL STRIP-MCNC: 116 MG/DL (ref 74–99)
GLUCOSE SERPL-MCNC: 95 MG/DL (ref 74–99)
HCT VFR BLD AUTO: 40.2 % (ref 36–46)
HGB BLD-MCNC: 13.6 G/DL (ref 12–16)
MAGNESIUM SERPL-MCNC: 2.04 MG/DL (ref 1.6–2.4)
MCH RBC QN AUTO: 30.4 PG (ref 26–34)
MCHC RBC AUTO-ENTMCNC: 33.8 G/DL (ref 32–36)
MCV RBC AUTO: 90 FL (ref 80–100)
NRBC BLD-RTO: 0 /100 WBCS (ref 0–0)
PHOSPHATE SERPL-MCNC: 3.5 MG/DL (ref 2.5–4.9)
PLATELET # BLD AUTO: 219 X10*3/UL (ref 150–450)
POTASSIUM SERPL-SCNC: 3.8 MMOL/L (ref 3.5–5.3)
RBC # BLD AUTO: 4.48 X10*6/UL (ref 4–5.2)
SODIUM SERPL-SCNC: 136 MMOL/L (ref 136–145)
WBC # BLD AUTO: 5.1 X10*3/UL (ref 4.4–11.3)

## 2024-07-19 PROCEDURE — 99233 SBSQ HOSP IP/OBS HIGH 50: CPT | Performed by: NURSE PRACTITIONER

## 2024-07-19 PROCEDURE — 99239 HOSP IP/OBS DSCHRG MGMT >30: CPT | Performed by: INTERNAL MEDICINE

## 2024-07-19 PROCEDURE — 2500000002 HC RX 250 W HCPCS SELF ADMINISTERED DRUGS (ALT 637 FOR MEDICARE OP, ALT 636 FOR OP/ED): Performed by: NURSE PRACTITIONER

## 2024-07-19 PROCEDURE — 80069 RENAL FUNCTION PANEL: CPT

## 2024-07-19 PROCEDURE — 97110 THERAPEUTIC EXERCISES: CPT | Mod: GP,CQ

## 2024-07-19 PROCEDURE — RXMED WILLOW AMBULATORY MEDICATION CHARGE

## 2024-07-19 PROCEDURE — 97116 GAIT TRAINING THERAPY: CPT | Mod: GP,CQ

## 2024-07-19 PROCEDURE — 82947 ASSAY GLUCOSE BLOOD QUANT: CPT

## 2024-07-19 PROCEDURE — 85027 COMPLETE CBC AUTOMATED: CPT

## 2024-07-19 PROCEDURE — 83735 ASSAY OF MAGNESIUM: CPT

## 2024-07-19 PROCEDURE — 36415 COLL VENOUS BLD VENIPUNCTURE: CPT

## 2024-07-19 PROCEDURE — 2500000001 HC RX 250 WO HCPCS SELF ADMINISTERED DRUGS (ALT 637 FOR MEDICARE OP): Performed by: NURSE PRACTITIONER

## 2024-07-19 RX ORDER — CLOPIDOGREL BISULFATE 75 MG/1
75 TABLET ORAL DAILY
Qty: 21 TABLET | Refills: 0 | Status: SHIPPED | OUTPATIENT
Start: 2024-07-20 | End: 2024-08-10

## 2024-07-19 RX ORDER — ATORVASTATIN CALCIUM 80 MG/1
80 TABLET, FILM COATED ORAL NIGHTLY
Qty: 30 TABLET | Refills: 0 | Status: SHIPPED | OUTPATIENT
Start: 2024-07-19 | End: 2024-08-18

## 2024-07-19 RX ORDER — ASPIRIN 81 MG/1
81 TABLET ORAL DAILY
Qty: 30 TABLET | Refills: 0 | Status: SHIPPED | OUTPATIENT
Start: 2024-07-19 | End: 2024-08-18

## 2024-07-19 ASSESSMENT — PAIN - FUNCTIONAL ASSESSMENT
PAIN_FUNCTIONAL_ASSESSMENT: 0-10
PAIN_FUNCTIONAL_ASSESSMENT: 0-10

## 2024-07-19 ASSESSMENT — COGNITIVE AND FUNCTIONAL STATUS - GENERAL
TURNING FROM BACK TO SIDE WHILE IN FLAT BAD: A LITTLE
MOBILITY SCORE: 21
CLIMB 3 TO 5 STEPS WITH RAILING: A LITTLE
WALKING IN HOSPITAL ROOM: A LITTLE

## 2024-07-19 ASSESSMENT — PAIN SCALES - GENERAL
PAINLEVEL_OUTOF10: 0 - NO PAIN
PAINLEVEL_OUTOF10: 0 - NO PAIN

## 2024-07-19 NOTE — PROGRESS NOTES
Christine Horowitz is a 83 y.o. female admitted for Cerebrovascular accident (CVA), unspecified mechanism (Multi). Pharmacy reviewed the patient's [id number]-of-[id number] medications and allergies for accuracy.    The list below reflects the PTA list prior to pharmacy medication history. A summary a changes to the PTA medication list has been listed below. Please review each medication in order reconciliation for additional clarification and justification.    Source of information:    Medications added:    Medications modified:    Medications to be removed:  MELOXICAM 15MG    Medications of concern:      Prior to Admission Medications   Prescriptions Last Dose Informant Patient Reported? Taking?   FLUoxetine (PROzac) 20 mg capsule 7/17/2024  No Yes   Sig: Take 1 capsule (20 mg) by mouth once daily.   amLODIPine (Norvasc) 5 mg tablet 7/17/2024  No Yes   Sig: Take 1 tablet (5 mg) by mouth once daily.   aspirin 81 mg EC tablet 7/17/2024  Yes Yes   Sig: Take 1 tablet (81 mg) by mouth once daily.   biotin 5 mg capsule 7/17/2024  Yes Yes   Sig: Take 1 capsule (5 mg) by mouth once daily.   cyanocobalamin (Vitamin B-12) 100 mcg tablet 7/17/2024  Yes Yes   Sig: Take 1 tablet (100 mcg) by mouth once daily.   irbesartan (Avapro) 150 mg tablet 7/17/2024  No Yes   Sig: Take 1 tablet (150 mg) by mouth once daily.   meloxicam (Mobic) 15 mg tablet 7/17/2024  Yes Yes   Sig: Take 1 tablet (15 mg) by mouth once daily as needed for mild pain (1 - 3) or moderate pain (4 - 6).   metoprolol succinate XL (Toprol-XL) 25 mg 24 hr tablet 7/17/2024  No Yes   Sig: Take 1 tablet (25 mg) by mouth once daily.   multivitamin with minerals (multivitamin-iron-folic acid) tablet 7/17/2024  Yes Yes   Sig: Take 1 tablet by mouth once daily.   oxybutynin (Ditropan) 5 mg tablet 7/17/2024  No Yes   Sig: TAKE 1 TABLET TWICE DAILY   simvastatin (Zocor) 20 mg tablet 7/17/2024  No Yes   Sig: Take 1 tablet (20 mg) by mouth once daily in the evening.       Facility-Administered Medications: None       Mae Borges

## 2024-07-19 NOTE — PROGRESS NOTES
Physical Therapy Discharge                 Therapy Communication Note    Patient Name: Christine Horowitz  MRN: [id number]  Today's Date: 7/19/2024     Discipline: Physical Therapy    Missed Visit Reason:      Missed Time:     Comment: Patient has met goals, no needs for further acute therapy. Discharge from PT.

## 2024-07-19 NOTE — NURSING NOTE
Patient discharged home in stable condition patient ambulatory NAD and has no complaints of pain. AVS reviewed with this RN patient verbalizes understanding and knows he has follow up instructions to follow.

## 2024-07-19 NOTE — PROGRESS NOTES
[facility] Progress Note    Christine Horowitz is a 83 y.o. female on day 2 of admission presenting with Cerebrovascular accident (CVA), unspecified mechanism (Multi).  Subjective   Pt seen again per provider request. Pt sitting in chair at bedside. Waiting for discharge. Doing well. No further symptoms of dizziness.        Objective     Vitals:    07/19/24 0800 07/19/24 0945 07/19/24 1005 07/19/24 1124   BP:   140/82    Pulse: 76 89 100    Resp: 20 17 22    Temp:    36.2 °C (97.2 °F)   TempSrc:       SpO2:   99%    Weight:       Height:             Physical Exam  Vitals reviewed.   Neurological:      Motor: Motor strength is normal.  Psychiatric:         Speech: Speech normal.       Neurological Exam  Mental Status  Awake, alert and oriented to person, place and time. Speech is normal. Language is fluent with no aphasia.    Cranial Nerves  CN II-XII grossly intact.    Motor  Normal muscle bulk throughout. No fasciculations present. Normal muscle tone. No abnormal involuntary movements. Strength is 5/5 throughout all four extremities.      Scheduled medications  aspirin, 81 mg, oral, Daily  atorvastatin, 80 mg, oral, Nightly  clopidogrel, 75 mg, oral, Daily  enoxaparin, 40 mg, subcutaneous, q24h  FLUoxetine, 20 mg, oral, Daily  oxybutynin, 5 mg, oral, BID  perflutren lipid microspheres, 0.5-10 mL of dilution, intravenous, Once in imaging  perflutren protein A microsphere, 0.5 mL, intravenous, Once in imaging  polyethylene glycol, 17 g, oral, Daily  sulfur hexafluoride microsphr, 2 mL, intravenous, Once in imaging      Continuous medications     PRN medications  PRN medications: hydrALAZINE **FOLLOWED BY** hydrALAZINE, labetaloL, oxygen     Lab Results   Component Value Date    WBC 5.1 07/19/2024    RBC 4.48 07/19/2024    HGB 13.6 07/19/2024    HCT 40.2 07/19/2024     07/19/2024     07/19/2024    K 3.8 07/19/2024     07/19/2024    BUN 17 07/19/2024    CREATININE 0.75 07/19/2024    EGFR 79  07/19/2024    CALCIUM 8.8 07/19/2024    ALKPHOS 48 07/17/2024    AST 25 07/17/2024    ALT 17 07/17/2024    MG 2.04 07/19/2024    HGBA1C 5.9 (H) 07/17/2024    LDLCALC 98 07/17/2024    CHOL 195 07/17/2024    HDL 61.1 07/17/2024    TRIG 179 (H) 07/17/2024       Below CT and MRI images and reports have been personally reviewed in PACS, agree with interpretations.    MR brain wo IV contrast 07/18/2024    Narrative  Interpreted By:  Renny King,  STUDY:  MR BRAIN WO IV CONTRAST;  7/18/2024 4:45 pm    INDICATION:  Signs/Symptoms:CVA, ataia.    COMPARISON:  CT head from 07/17/2020.    ACCESSION NUMBER(S):  [id number]    ORDERING CLINICIAN:  LATESHA GONZÁLES    TECHNIQUE:  Axial T2, FLAIR, DWI, gradient echo T2 and sagittal and coronal T1  weighted images of brain were acquired.    FINDINGS:  CSF Spaces: The ventricles, sulci and basal cisterns are prominent  size due to age related diffuse cerebral volume loss. There is no  abnormal extra-axial fluid collection.    Parenchyma: There is no diffusion restriction abnormality to suggest  acute infarct. There are multiple regions of T2 hyperintensity within  the bilateral cerebral hemispheric white matter which are probably  sequela of chronic small vessel ischemic changes. There is no mass  effect or midline shift.    Paranasal Sinuses and Mastoids: Visualized paranasal sinuses are  essentially clear. There is partial opacification of the bilateral  mastoid air cells with nonspecific fluid.    Impression  1. No acute intracranial abnormality or mass effect.    2. Findings of probable chronic small vessel ischemic changes.    This study was interpreted at [facility].    MACRO:  None    Signed by: Renny King 7/18/2024 6:02 PM  Dictation workstation:   [id number]      Transthoracic Echo (TTE) Complete 07/18/2024    Narrative  [place] [id number]  Phone [phone] Fax  [phone]    TRANSTHORACIC ECHOCARDIOGRAM REPORT    Patient Name:      SAI MULTANI  Reading Physician:    [id number] Jason Reid DO  Study Date:        7/18/2024             Ordering Provider:    [id number] POLO RAMONA ANOTNY  MRN/PID:           [id number]              Fellow:  Accession#:        [id number]          Nurse:                ICU Nurse  Date of Birth/Age: 1940 / 83 years Sonographer:          Trinity Boss RDCS  Gender:            F                     Additional Staff:  Height:            167.64 cm             Admit Date:           7/17/2024  Weight:            78.02 kg              Admission Status:     Inpatient -  Routine  BSA / BMI:         1.88 m2 / 27.76 kg/m2 Department Location:  [facility]  Blood Pressure: 127 /60 mmHg    Study Type:    TRANSTHORACIC ECHO (TTE) COMPLETE  Diagnosis/ICD: Cerebral Infarction, unspecified-I63.9  Indication:    Cerebrovascular Accident  CPT Codes:     Echo Complete w Full [id number]    Patient History: No pertinent past medical history.  Study Detail: The following Echo studies were performed: 2D, M-Mode, Doppler and  color flow. Agitated saline used as a contrast agent for  intraseptal flow evaluation.      PHYSICIAN INTERPRETATION:  Left Ventricle: Left ventricular ejection fraction is normal, calculated by Escamilla's biplane at 63%. There are no regional wall motion abnormalities. The left ventricular cavity size is normal. Spectral Doppler shows a pseudonormal pattern of left ventricular diastolic filling.  Left Atrium: The left atrium is moderately dilated. A bubble study using agitated saline was performed. Bubble study is negative.  Right Ventricle: The right ventricle is normal in size. There is normal right ventricular global systolic function.  Right Atrium: The right atrium is normal in size.  Aortic Valve: The aortic valve is trileaflet. There is mild aortic valve cusp calcification. The aortic valve dimensionless index is 0.67. There is no  evidence of aortic valve regurgitation. The peak instantaneous gradient of the aortic valve is 8.3 mmHg. The mean gradient of the aortic valve is 5.0 mmHg.  Mitral Valve: The mitral valve is normal in structure. There is mild mitral valve regurgitation.  Tricuspid Valve: The tricuspid valve is structurally normal. There is mild tricuspid regurgitation. The Doppler estimated RVSP is mildly elevated at 40.9 mmHg.  Pulmonic Valve: The pulmonic valve is structurally normal. There is physiologic pulmonic valve regurgitation.  Pericardium: There is no pericardial effusion noted.  Aorta: The aortic root is normal.  Systemic Veins: The inferior vena cava appears to be of normal size. There is IVC inspiratory collapse greater than 50%.      CONCLUSIONS:  1. Left ventricular ejection fraction is normal, calculated by Escamilla's biplane at 63%.  2. Spectral Doppler shows a pseudonormal pattern of left ventricular diastolic filling.  3. There is normal right ventricular global systolic function.  4. The left atrium is moderately dilated.  5. Mildly elevated RVSP.    QUANTITATIVE DATA SUMMARY:  2D MEASUREMENTS:  Normal Ranges:  Ao Root d:     2.80 cm   (2.0-3.7cm)  LAs:           4.50 cm   (2.7-4.0cm)  IVSd:          1.00 cm   (0.6-1.1cm)  LVPWd:         0.90 cm   (0.6-1.1cm)  LVIDd:         4.70 cm   (3.9-5.9cm)  LVIDs:         2.60 cm  LV Mass Index: 81.8 g/m2  LV % FS        44.7 %    LA VOLUME:  Normal Ranges:  LA Vol A4C:        69.7 ml    (22+/-6mL/m2)  LA Vol A2C:        82.6 ml  LA Vol BP:         81.6 ml  LA Vol Index A4C:  37.2ml/m2  LA Vol Index A2C:  44.0 ml/m2  LA Vol Index BP:   43.5 ml/m2  LA Area A4C:       21.6 cm2  LA Area A2C:       25.3 cm2  LA Major Axis A4C: 5.7 cm  LA Major Axis A2C: 6.6 cm    RA VOLUME BY A/L METHOD:  Normal Ranges:  RA Area A4C: 17.8 cm2    AORTA MEASUREMENTS:  Normal Ranges:  Ao Sinus, d: 2.80 cm (2.1-3.5cm)  Ao STJ, d:   2.20 cm (1.7-3.4cm)  Asc Ao, d:   3.50 cm (2.1-3.4cm)    LV  SYSTOLIC FUNCTION BY 2D PLANIMETRY (MOD):  Normal Ranges:  EF-A4C View:    61 % (>=55%)  EF-A2C View:    62 %  EF-Biplane:     63 %  LV EF Reported: 63 %    LV DIASTOLIC FUNCTION:  Normal Ranges:  MV Peak E:    0.89 m/s  (0.7-1.2 m/s)  MV Peak A:    0.76 m/s  (0.42-0.7 m/s)  E/A Ratio:    1.16      (1.0-2.2)  MV e'         0.060 m/s (>8.0)  MV lateral e' 0.06 m/s  MV medial e'  0.06 m/s  E/e' Ratio:   14.83     (<8.0)    MITRAL VALVE:  Normal Ranges:  MV DT: 187 msec (150-240msec)    AORTIC VALVE:  Normal Ranges:  AoV Vmax:                1.44 m/s (<=1.7m/s)  AoV Peak P.3 mmHg (<20mmHg)  AoV Mean P.0 mmHg (1.7-11.5mmHg)  LVOT Max Daniel:            0.92 m/s (<=1.1m/s)  AoV VTI:                 34.00 cm (18-25cm)  LVOT VTI:                22.70 cm  LVOT Diameter:           2.00 cm  (1.8-2.4cm)  AoV Area, VTI:           2.10 cm2 (2.5-5.5cm2)  AoV Area,Vmax:           2.01 cm2 (2.5-4.5cm2)  AoV Dimensionless Index: 0.67      RIGHT VENTRICLE:  RV Basal 3.50 cm  RV Mid   2.80 cm  RV Major 6.7 cm  TAPSE:   27.3 mm  RV s'    0.18 m/s    TRICUSPID VALVE/RVSP:  Normal Ranges:  Peak TR Velocity: 3.08 m/s  RV Syst Pressure: 40.9 mmHg (< 30mmHg)  IVC Diam:         0.90 cm      [id number] Jason Reid DO  Electronically signed on 2024 at 12:22:24 PM        ** Final **      CT angio head and neck w and wo IV contrast 2024    Narrative  Interpreted By:  Brandy Salinas,  STUDY:  CT ANGIO HEAD AND NECK W AND WO IV CONTRAST;  2024 8:22 pm    INDICATION:  Signs/Symptoms:stroke    COMPARISON:  Correlation with noncontrast head CT earlier the same day    ACCESSION NUMBER(S):  [id number]    ORDERING CLINICIAN:  POLO ANTONY    TECHNIQUE:  Axial noncontrast images of the head were obtained. Following IV  contrast administration, a CT angiography of the head  and neck was  performed.  Triplanar MIPS  and 3D reconstructions of the [place]  and neck were  generated.    FINDINGS:  NON-CONTRAST HEAD CT:    No acute intracranial hemorrhage, mass-effect, evidence of large  vessel ischemic infarct, or extra-axial fluid collection. Deep and  periventricular white matter hypodensities are nonspecific, but  favored to represent chronic small vessel ischemic changes.  Gray-white matter distinction is preserved.    No mass-effect, midline shift, sulcal effacement, or effacement of  the basal cisterns.    The brain parenchymal volume and ventricles are age-appropriate. No  hydrocephalus.    No acute skull fracture.  The visualized orbits  are intact.  The visualized paranasal sinuses show  no significant abnormality.  The mastoids are clear.      CTA NECK:    The great vessel origins are patent with no significant stenosis.    LEFT VERTEBRAL ARTERY:  No hemodynamically significant stenosis,  occlusion, or dissection.    LEFT COMMON/INTERNAL CAROTID ARTERY: Calcification of the bifurcation  and proximal external carotid artery no hemodynamically significant  stenosis, occlusion, or dissection.    RIGHT VERTEBRAL ARTERY:  No hemodynamically significant stenosis,  occlusion, or dissection.    RIGHT COMMON/INTERNAL CAROTID ARTERY: Mild calcification of the  carotid bifurcation.  No hemodynamically significant stenosis,  occlusion, or dissection.      The neck soft tissues show no evidence of mass, fluid collection, or  enlarged lymph nodes.    There is no acute osseous abnormality. Diffuse cervical degenerative  disc changes. The imaged lungs are clear.      CTA HEAD:    ANTERIOR CIRCULATION: No occlusion or aneurysm.    - Internal Carotid Arteries: Calcification of bilateral cavernous and  ophthalmic segments, resulting in mild stenosis.    - Middle Cerebral Arteries: There is focal stenosis of the left M2  inferior branch. No significant stenosis on the right    - Anterior Cerebral Arteries: Patent, with no hemodynamically  significant stenosis.      POSTERIOR CIRCULATION: No  occlusion or aneurysm.    - Intracranial Vertebral Arteries: Patent, with no hemodynamically  significant stenosis.    - Basilar Artery: Patent, with no hemodynamically significant  stenosis.    - Posterior Cerebral Arteries: Patent, with no hemodynamically  significant stenosis.    No arteriovenous malformation is visualized.  No pathologic intracranial enhancement or discrete mass.  The dural venous sinuses are patent.    MIPS and 3D reconstructions confirm the above findings.    Impression  1. No intracranial large vessel arterial branch occlusion. At least  moderate focal stenosis of the left MCA inferior M2 branch.    2. No significant stenosis of the cervical carotid or vertebral  arteries.        MACRO:  None    Signed by: Brandy Salinas 7/17/2024 9:25 PM  Dictation workstation:   [id number]      NIH Stroke Scale  1A. Level of Consciousness: Alert, Keenly Responsive  1B. Ask Month and Age: Both Questions Right  1C. Blink Eyes & Squeeze Hands: Performs Both Tasks  2. Best Gaze: Normal  3. Visual: No Visual Loss  4. Facial Palsy: Normal Symmetrical Movements  5A. Motor - Left Arm: No Drift  5B. Motor - Right Arm: No Drift  6A. Motor - Left Leg: No Drift  6B. Motor - Right Leg: No Drift  7. Limb Ataxia: Absent  8. Sensory Loss: Normal  9. Best Language: No Aphasia  10. Dysarthria: Normal  11. Extinction and Inattention: No Abnormality  NIH Stroke Scale: 0          Conception Coma Scale  Best Eye Response: Spontaneous  Best Verbal Response: Oriented  Best Motor Response: Follows commands  Sharon Coma Scale Score: 15        Assessment/Plan   This patient currently has cardiac telemetry ordered; if you would like to modify or discontinue the telemetry order, click here to go to the orders activity to modify/discontinue the order.  Principal Problem:    Cerebrovascular accident (CVA), unspecified mechanism (Multi)  Active Problems:    Hypercholesterolemia    Primary hypertension      IMPRESSION:  Christine Horowitz  is a 83 y.o. female with a history of HTN, mild CAD, and DLD presenting with dizziness. Neurology was consulted for stroke s/p TNK. On ASA 81 mg and statin PTA.  HCT unremarkable  CTA head/neck with moderate focal stenosis of L M2 segment  Lipid panel with LDL 98, CHOL 195,   A1C 5.9%  TTE with normal EF, moderately dilated LA size, negative bubble study.  MRI brain wo contrast s/p TNK without any stroke findings.      Suspected posterior stroke, aborted by TNK  S/p TNK  Dizziness, truncal ataxia, nystagmus - all resolved    RECOMMENDATIONS:  Continue supportive care.   Start DAPT of 81 mg aspirin and 75 mg plavix x 21 days then stop plavix and stay on 81 mg aspirin daily.   Continue HI statin (change from home simvastatin).   Holter monitor on discharge x 2 weeks, ordered.   Continue to monitor and manage stroke risks with PCP outpatient.     Discussed with patient and RN. All questions answered.   Ok to discharge from neuro standpoint.   Follow up with myself in ~4 weeks outpatient.        Radha Iniguez, CAL-CNP

## 2024-07-19 NOTE — DISCHARGE SUMMARY
Discharge Diagnosis  Cerebrovascular accident (CVA), unspecified mechanism (Multi)  HTN  HLD    Issues Requiring Follow-Up      Discharge Meds     Your medication list        START taking these medications        Instructions Last Dose Given Next Dose Due   atorvastatin 80 mg tablet  Commonly known as: Lipitor      Take 1 tablet (80 mg) by mouth once daily at bedtime.       clopidogrel 75 mg tablet  Commonly known as: Plavix  Start taking on: July 20, 2024      Take 1 tablet (75 mg) by mouth once daily for 21 days.              CONTINUE taking these medications        Instructions Last Dose Given Next Dose Due   amLODIPine 5 mg tablet  Commonly known as: Norvasc      Take 1 tablet (5 mg) by mouth once daily.       aspirin 81 mg EC tablet      Take 1 tablet (81 mg) by mouth once daily.       biotin 5 mg capsule           cyanocobalamin 100 mcg tablet  Commonly known as: Vitamin B-12           FLUoxetine 20 mg capsule  Commonly known as: PROzac      Take 1 capsule (20 mg) by mouth once daily.       irbesartan 150 mg tablet  Commonly known as: Avapro      Take 1 tablet (150 mg) by mouth once daily.       metoprolol succinate XL 25 mg 24 hr tablet  Commonly known as: Toprol-XL      Take 1 tablet (25 mg) by mouth once daily.       multivitamin with minerals tablet           oxybutynin 5 mg tablet  Commonly known as: Ditropan      TAKE 1 TABLET TWICE DAILY       simvastatin 20 mg tablet  Commonly known as: Zocor      Take 1 tablet (20 mg) by mouth once daily in the evening.                 Where to Get Your Medications        These medications were sent to [facility]  [address] [facility] [id number]      Hours: 8AM to 6PM Mon-Fri, 8AM to 4PM Sat-Sun Phone: [phone]   aspirin 81 mg EC tablet  atorvastatin 80 mg tablet  clopidogrel 75 mg tablet         Test Results Pending At Discharge  Pending Labs       No current pending labs.            Hospital Course  Christine Horowitz is a 83 y.o. female  with past medical history of hypertension, mild coronary artery disease, hyperlipidemia, mitral valve prolapse, depression, and OAB presented to [facility] ED complaints of severe dizziness, nausea, and vomiting starting 30 minutes prior to arrival.  Upon ED workup, patient was noted to have nystagmus and ataxia.  Her initial vital signs on presentation were temperature 36.7 °C, heart rate 79, respiratory rate 20, blood pressure 146/72, and SpO2 100% on room air.  ED labs revealed blood glucose 122, sodium 135, bicarbonate 19, APTT 24, , and remainder of CMP and CBC unremarkable.  Stroke alert was activated and patient received CT head under brain attack which did not demonstrate any acute pathology.  ED provider coordinated with [facility] stroke neurologist about concerns for stroke and TNK was recommended and given.  ED interventions included Reglan IV x 1, Benadryl IV x 1, 1 L IV fluid bolus, TNK administration, and neurology consult.     Patient seen and examined in ICU bed 15.  Patient alert and oriented x 4 and in no acute distress.  Patient stated that she was at home talking on the phone with her granddaughter when she suddenly became very dizzy, having feelings of being hot and cold, and seeing double chairs.  Patient stated that she got off the phone with her granddaughter because she was not feeling well and tried closing her eyes to see if that would help with the dizziness and did not have any improvement in symptoms so she called EMS.  Patient denies any dizziness or double vision/blurred vision at this time.  Patient states she feels 100% better.  She denies any syncopal episodes, chest pain, cough, shortness of breath, blurred/double vision, nausea, vomiting, diarrhea, abdominal pain, hematuria, hematochezia, hematemesis, paresthesias, or any sick contacts.  NIHSS score 0.  Patient denies any tobacco use or drug use.  She states that she occasionally drinks a beer after doing yard work.  She denies  having any episodes like this in the past.  Critical care will follow and likely will sign off tomorrow afternoon pending MRI.  7/18: Patient was seen and examined.  She's awake and alert and interactive. Participated in PT/OT adequately. Status post TNK yesterday and procedure well-tolerated vitals remained stable today.  Post TNK repeat CT/MRI later today after 24 hours.  HbA1c is 5.8.  Lipid panel showed slightly elevated triglyceride at 179.  Neurologist and intensivist on board and recommendations appreciated   7/19 Seen jesus alberto poole . Post TNK MRI completed. NO acute infarction  reported Neurology signed off. She was discharged in stable condtion to follow upo with PCP within 1 week and Neurology as scheduled.    The discharge process took about 35 mins    Pertinent Physical Exam At Time of Discharge  Physical Exam  Constitutional:       General: She is not in acute distress.  HENT:      Head: Normocephalic and atraumatic.      Right Ear: External ear normal.      Left Ear: External ear normal.      Nose: Nose normal.      Mouth/Throat:      Mouth: Mucous membranes are moist.      Pharynx: Oropharynx is clear.   Eyes:      General: No scleral icterus.     Extraocular Movements: Extraocular movements intact.      Conjunctiva/sclera: Conjunctivae normal.      Pupils: Pupils are equal, round, and reactive to light.   Cardiovascular:      Rate and Rhythm: Normal rate and regular rhythm.      Pulses: Normal pulses.      Heart sounds: Normal heart sounds. No murmur heard.  Pulmonary:      Effort: Pulmonary effort is normal. No respiratory distress.      Breath sounds: Normal breath sounds. No wheezing, rhonchi or rales.   Chest:      Chest wall: No tenderness.   Abdominal:      General: Bowel sounds are normal. There is no distension.      Palpations: Abdomen is soft. There is no mass.      Tenderness: There is no abdominal tenderness. There is no rebound.   Musculoskeletal:         General: No swelling or deformity.  Normal range of motion.      Cervical back: Normal range of motion.      Right lower leg: No edema.      Left lower leg: No edema.   Skin:     General: Skin is warm and dry.      Capillary Refill: Capillary refill takes less than 2 seconds.   Neurological:      General: No focal deficit present.      Mental Status: She is alert and oriented to person, place, and time.      Cranial Nerves: No cranial nerve deficit.      Sensory: No sensory deficit.      Motor: No weakness.      Coordination: Coordination normal.   Psychiatric:         Mood and Affect: Mood normal.         Behavior: Behavior normal.   Outpatient Follow-Up  Future Appointments   Date Time Provider Department Center   10/29/2024  9:00 AM Dacia Hyde DO [id number] [facility]   12/4/2024 11:00 AM CAL Aguirre-CNP [id number] [facility]         Celeste Arce MD

## 2024-07-19 NOTE — PROGRESS NOTES
Medication Education     Medication education for Christine Horowitz was provided to the patient  for the following medication(s):  Clopidogrel, aspirin, and atorvastatin      Medication education provided by a Pharmacist:  Dose, frequency, storage How to take and what to do if a dose is missed Proper dose, indication, possible ADRs Potential duration of therapy    Identified potential barriers to education:  None    Method(s) of Education:  Verbal    An opportunity to ask questions and receive answers was provided.     Assessment of understanding the patient :  2= meets goals/outcomes    Additional Notes (if applicable):     Saritha Prabhakar

## 2024-07-19 NOTE — CARE PLAN
The patient's goals for the shift include      The clinical goals for the shift include Pt neuro status will be unchanged    Problem: Skin  Goal: Prevent/manage excess moisture  Outcome: Progressing  Goal: Prevent/minimize sheer/friction injuries  Outcome: Progressing  Flowsheets (Taken 7/18/2024 2135)  Prevent/minimize sheer/friction injuries: Increase activity/out of bed for meals     Problem: ACS/CP/NSTEMI/STEMI  Goal: Lab values return to normal range  Outcome: Progressing  Goal: Promote self management  Outcome: Progressing  Goal: Verbalize understanding of procedures/devices  Outcome: Progressing  Goal: Wean vasopressors/achieve hemodynamic stability  Outcome: Progressing     Problem: Arrythmia/Dysrhythmia  Goal: Lab values return to normal range  Outcome: Progressing  Goal: Promote self management  Outcome: Progressing     Problem: General Stroke  Goal: Establish a mutual long term goal with patient by discharge  Outcome: Progressing  Goal: Demonstrate improvement in neurological exam throughout the shift  Outcome: Progressing  Goal: Maintain BP within ordered limits throughout shift  Outcome: Progressing  Goal: Participate in treatment (ie., meds, therapy) throughout shift  Outcome: Progressing  Goal: No symptoms of aspiration throughout shift  Outcome: Progressing  Goal: No symptoms of hemorrhage throughout shift  Outcome: Progressing  Goal: Tolerate enteral feeding throughout shift  Outcome: Progressing  Goal: Decreased nausea/vomiting throughout shift  Outcome: Progressing  Goal: Controlled blood glucose throughout shift  Outcome: Progressing  Goal: Out of bed three times today  Outcome: Progressing     Problem: ICU Stroke  Goal: Maintain ICP within ordered limits throughout shift  Outcome: Progressing  Goal: Tolerate EVD clamping trial throughout shift  Outcome: Progressing  Goal: Tolerate ventilator weaning trial during shift  Outcome: Progressing  Goal: Maintain patent airway throughout shift  Outcome:  Progressing  Goal: Achieve/maintain targeted sodium level throughout shift  Outcome: Progressing     Problem: Pain - Adult  Goal: Verbalizes/displays adequate comfort level or baseline comfort level  Outcome: Progressing     Problem: Discharge Planning  Goal: Discharge to home or other facility with appropriate resources  Outcome: Progressing

## 2024-07-19 NOTE — PROGRESS NOTES
07/19/24 1234   Discharge Planning   Expected Discharge Disposition Home     Spoke to pt, role of TCC explained. Pt confirms her plan is home and that she is without CT needs. Per notes pt will need holter monitor x2 weeks at discharge. Pt unaware of same, briefly described purpose and process of same. Pt swims and states that she can put swimming on hold for 2 weeks for same. Plan- Home no other needs. CT will follow.

## 2024-07-19 NOTE — PROGRESS NOTES
Spiritual Care Visit    Clinical Encounter Type  Visited With: Patient  Routine Visit: Follow-up

## 2024-07-19 NOTE — PROGRESS NOTES
Physical Therapy    Physical Therapy Treatment    Patient Name: Christine Horowitz  MRN: [id number]  Today's Date: 7/19/2024  Time Calculation  Start Time: 0942  Stop Time: 1005  Time Calculation (min): 23 min    Assessment/Plan   PT Assessment  PT Assessment Results: Decreased strength, Decreased range of motion, Decreased endurance, Impaired balance, Decreased mobility, Decreased safety awareness  End of Session Communication: Bedside nurse, PCT/NA/CTA  Assessment Comment: patient progressed to 400 feet  safe with stairs and transfers no deficits reported  by patient or seen  End of Session Patient Position: Up in chair, Alarm off, not on at start of session  PT Plan  Inpatient/Swing Bed or Outpatient: Inpatient  PT Plan  Treatment/Interventions: Bed mobility, Transfer training, Gait training, Strengthening, Range of motion, Therapeutic exercise  PT Plan: Ongoing PT  PT Frequency: 5 times per week  PT Discharge Recommendations: No PT needed after discharge  PT Recommended Transfer Status: Assist x1  PT - OK to Discharge: Yes (upon medical clearance)      General Visit Information:   PT  Visit  PT Received On: 07/19/24  General  Prior to Session Communication: Bedside nurse  Patient Position Received: Up in chair, Alarm off, not on at start of session  General Comment: patient eager for therapy and to go home    Subjective   Precautions:     Vital Signs:       Objective   Pain:  Pain Assessment  Pain Assessment: 0-10  0-10 (Numeric) Pain Score: 0 - No pain (no pain)  Cognition:  Cognition  Overall Cognitive Status: Within Functional Limits  Coordination:     Postural Control:     Extremity/Trunk Assessments:    Activity Tolerance:  Activity Tolerance  Endurance: Tolerates 30 min exercise with multiple rests  Treatments:  Therapeutic Exercise  Therapeutic Exercise Performed: Yes  Therapeutic Exercise Activity 1: standing hip abduction, flexion extension 20reps each with window sill for balance one  hand      Ambulation/Gait Training  Ambulation/Gait Training Performed: Yes  Ambulation/Gait Training 1  Comments/Distance (ft) 1: gait training wihtoout device 400 feet wiiht SBA, ascend and descned  1 flight of steps with SBA  Transfers  Transfer: Yes  Transfer 1  Transfer From 1: Sit to  Transfer to 1: Stand, Chair with arms    Outcome Measures:  [facility] Basic Mobility  Turning from your back to your side while in a flat bed without using bedrails: None  Moving from lying on your back to sitting on the side of a flat bed without using bedrails: A little  Moving to and from bed to chair (including a wheelchair): None  Standing up from a chair using your arms (e.g. wheelchair or bedside chair): None  To walk in hospital room: A little  Climbing 3-5 steps with railing: A little  Basic Mobility - Total Score: 21    Education Documentation  Mobility Training, taught by Zofia Quinones PTA at 7/19/2024 10:52 AM.  Learner: Patient  Readiness: Acceptance  Method: Explanation  Response: Verbalizes Understanding    Education Comments  No comments found.        OP EDUCATION:       Encounter Problems       Encounter Problems (Active)       Pain - Adult             Encounter Problems (Resolved)       PT Problem       Gait (Met)       Start:  07/18/24    Expected End:  08/01/24    Resolved:  07/19/24    Pt will be able to ambulate 500 ft without device use and no greater than SBA without symptoms of dizziness in order to progress towards return to community ambulation.         Stairs (Met)       Start:  07/18/24    Expected End:  08/01/24    Resolved:  07/19/24    Pt will be able to navigate 3 stairs with YURY handrails with no greater than SBA in order to be able to discharge safely to home environment.         Strengthening (Met)       Start:  07/18/24    Expected End:  08/01/24    Resolved:  07/19/24    Pt will participate in 20+ reps of BLE strengthening activities to improve strength and endurance to decrease assistance  needed upon discharge.

## 2024-07-22 ENCOUNTER — PATIENT OUTREACH (OUTPATIENT)
Dept: CARE COORDINATION | Facility: CLINIC | Age: 84
End: 2024-07-22
Payer: MEDICARE

## 2024-07-22 NOTE — PROGRESS NOTES
Discharge Facility:[facility]  Discharge Diagnosis:CVA  Admission Date:07/17/24  Discharge Date: 07/19/24    PCP Appointment Date:none available sent to pcp office  Specialist Appointment Date:   Hospital Encounter and Summary Linked: Yes  See discharge assessment below for further details  Engagement  Call Start Time: 0902 (7/22/2024  9:02 AM)    Medications  Medications reviewed with patient/caregiver?: Yes (lipitor  80mg plavix 75mg) (7/22/2024  9:02 AM)  Is the patient having any side effects they believe may be caused by any medication additions or changes?: No (7/22/2024  9:02 AM)  Does the patient have all medications ordered at discharge?: Yes (7/22/2024  9:02 AM)  Is the patient taking all medications as directed (includes completed medication regime)?: Yes (7/22/2024  9:02 AM)    Appointments  Does the patient have a primary care provider?: Yes (7/22/2024  9:02 AM)    Self Management  Has home health visited the patient within 72 hours of discharge?: Not applicable (7/22/2024  9:02 AM)  Has all Durable Medical Equipment (DME) been delivered?: No (7/22/2024  9:02 AM)    Patient Teaching  Does the patient have access to their discharge instructions?: Yes (7/22/2024  9:02 AM)  What is the patient's perception of their health status since discharge?: Improving (7/22/2024  9:02 AM)    Wrap Up  Call End Time: 0910 (7/22/2024  9:02 AM)

## 2024-07-31 ENCOUNTER — PATIENT OUTREACH (OUTPATIENT)
Dept: CARE COORDINATION | Facility: CLINIC | Age: 84
End: 2024-07-31

## 2024-07-31 ENCOUNTER — APPOINTMENT (OUTPATIENT)
Dept: PRIMARY CARE | Facility: CLINIC | Age: 84
End: 2024-07-31
Payer: MEDICARE

## 2024-07-31 VITALS
OXYGEN SATURATION: 98 % | SYSTOLIC BLOOD PRESSURE: 138 MMHG | DIASTOLIC BLOOD PRESSURE: 64 MMHG | HEART RATE: 74 BPM | TEMPERATURE: 96.9 F | WEIGHT: 151 LBS | RESPIRATION RATE: 13 BRPM | BODY MASS INDEX: 24.37 KG/M2

## 2024-07-31 DIAGNOSIS — I63.9 CEREBROVASCULAR ACCIDENT (CVA), UNSPECIFIED MECHANISM (MULTI): ICD-10-CM

## 2024-07-31 DIAGNOSIS — I49.9 IRREGULAR HEART BEAT: ICD-10-CM

## 2024-07-31 DIAGNOSIS — I63.20 CEREBROVASCULAR ACCIDENT (CVA) DUE TO OCCLUSION OF PRECEREBRAL ARTERY (MULTI): Primary | ICD-10-CM

## 2024-07-31 PROBLEM — N32.81 OVERACTIVE BLADDER: Status: ACTIVE | Noted: 2024-07-31

## 2024-07-31 PROBLEM — H90.3 SENSORINEURAL HEARING LOSS (SNHL) OF BOTH EARS: Status: ACTIVE | Noted: 2024-07-31

## 2024-07-31 PROBLEM — I10 HYPERTENSION: Status: ACTIVE | Noted: 2024-07-31

## 2024-07-31 PROBLEM — F32.9 MAJOR DEPRESSIVE DISORDER: Status: ACTIVE | Noted: 2024-07-31

## 2024-07-31 PROBLEM — H60.509 ACUTE OTITIS EXTERNA: Status: ACTIVE | Noted: 2024-07-31

## 2024-07-31 PROBLEM — I25.10 ARTERIOSCLEROSIS OF CORONARY ARTERY: Status: ACTIVE | Noted: 2024-07-31

## 2024-07-31 PROBLEM — M16.10 ARTHRITIS OF HIP: Status: ACTIVE | Noted: 2024-07-31

## 2024-07-31 PROBLEM — H60.509 ACUTE OTITIS EXTERNA: Status: RESOLVED | Noted: 2024-07-31 | Resolved: 2024-07-31

## 2024-07-31 PROCEDURE — 3075F SYST BP GE 130 - 139MM HG: CPT

## 2024-07-31 PROCEDURE — 99495 TRANSJ CARE MGMT MOD F2F 14D: CPT

## 2024-07-31 PROCEDURE — 93000 ELECTROCARDIOGRAM COMPLETE: CPT

## 2024-07-31 PROCEDURE — 1159F MED LIST DOCD IN RCRD: CPT

## 2024-07-31 PROCEDURE — 1111F DSCHRG MED/CURRENT MED MERGE: CPT

## 2024-07-31 PROCEDURE — 1126F AMNT PAIN NOTED NONE PRSNT: CPT

## 2024-07-31 PROCEDURE — 1036F TOBACCO NON-USER: CPT

## 2024-07-31 PROCEDURE — 3078F DIAST BP <80 MM HG: CPT

## 2024-07-31 RX ORDER — ATORVASTATIN CALCIUM 80 MG/1
80 TABLET, FILM COATED ORAL NIGHTLY
Qty: 90 TABLET | Refills: 3 | Status: SHIPPED | OUTPATIENT
Start: 2024-07-31

## 2024-07-31 SDOH — ECONOMIC STABILITY: FOOD INSECURITY: WITHIN THE PAST 12 MONTHS, THE FOOD YOU BOUGHT JUST DIDN'T LAST AND YOU DIDN'T HAVE MONEY TO GET MORE.: NEVER TRUE

## 2024-07-31 SDOH — ECONOMIC STABILITY: FOOD INSECURITY: WITHIN THE PAST 12 MONTHS, YOU WORRIED THAT YOUR FOOD WOULD RUN OUT BEFORE YOU GOT MONEY TO BUY MORE.: NEVER TRUE

## 2024-07-31 ASSESSMENT — ENCOUNTER SYMPTOMS
RESPIRATORY NEGATIVE: 1
PSYCHIATRIC NEGATIVE: 1
GASTROINTESTINAL NEGATIVE: 1
CARDIOVASCULAR NEGATIVE: 1
CONSTITUTIONAL NEGATIVE: 1
ENDOCRINE NEGATIVE: 1
NEUROLOGICAL NEGATIVE: 1
HEMATOLOGIC/LYMPHATIC NEGATIVE: 1
EYES NEGATIVE: 1
MUSCULOSKELETAL NEGATIVE: 1

## 2024-07-31 ASSESSMENT — PATIENT HEALTH QUESTIONNAIRE - PHQ9
SUM OF ALL RESPONSES TO PHQ9 QUESTIONS 1 & 2: 0
2. FEELING DOWN, DEPRESSED OR HOPELESS: NOT AT ALL
1. LITTLE INTEREST OR PLEASURE IN DOING THINGS: NOT AT ALL

## 2024-07-31 ASSESSMENT — LIFESTYLE VARIABLES
AUDIT-C TOTAL SCORE: 0
HOW MANY STANDARD DRINKS CONTAINING ALCOHOL DO YOU HAVE ON A TYPICAL DAY: PATIENT DOES NOT DRINK
HOW OFTEN DO YOU HAVE A DRINK CONTAINING ALCOHOL: NEVER
SKIP TO QUESTIONS 9-10: 1
HOW OFTEN DO YOU HAVE SIX OR MORE DRINKS ON ONE OCCASION: NEVER

## 2024-07-31 ASSESSMENT — PAIN SCALES - GENERAL: PAINLEVEL: 0-NO PAIN

## 2024-07-31 NOTE — ASSESSMENT & PLAN NOTE
Irregularly irregular heart beat auscultated on office today  EKG in office with sheree    Continue current regimen  Follow up with cardiology as scheduled

## 2024-07-31 NOTE — PROGRESS NOTES
Call regarding appt. with PCP on 07/31/24 after hospitalization.  At time of outreach call the patient feels as if their condition has improved since last visit.  Reviewed the PCP appointment with the pt and addressed any questions or concerns.

## 2024-07-31 NOTE — PATIENT INSTRUCTIONS
Thank you for coming to see me today.  If you have any questions or concerns following our visit, please contact the office.  Phone: [phone]    Follow up with Dr. Hyde as previously scheduled     1)  STOP simvastatin. CONTINUE atorvastatin 80mg daily (higher intensity statin which is guideline recommended after having a stroke)    2) Continue clopidogrel 75mg daily    3) I am referring you to neurology with Jazmin Iniguez NP - please call [phone] to schedule an appointment or schedule at  on your way out

## 2024-07-31 NOTE — PROGRESS NOTES
"Patient: Christine Horowitz  : 1940  PCP: Dacia Hyde DO  MRN: [id number]  Program: Transitional Care Management  Status: Enrolled  Effective Dates: 2024 - present  Responsible Staff: Patience Knowles LPN  Social Determinants to be Addressed: Physical Activity, Social Connections, Stress         Christine Horowitz is a 83 y.o. female presenting today for follow-up after being discharged from the hospital 12 days ago. The main problem requiring admission was CVA. The discharge summary and/or Transitional Care Management documentation was reviewed. Medication reconciliation was performed as indicated via the \"Michael as Reviewed\" timestamp.     Christine Horowitz was contacted by Transitional Care Management services two days after her discharge. This encounter and supporting documentation was reviewed.    Admitted at [facility] ED for severe dizziness, nausea, nystagmus and ataxia.     Review of Systems   Constitutional: Negative.    HENT: Negative.     Eyes: Negative.    Respiratory: Negative.     Cardiovascular: Negative.    Gastrointestinal: Negative.    Endocrine: Negative.    Genitourinary: Negative.    Musculoskeletal: Negative.    Skin: Negative.    Neurological: Negative.    Hematological: Negative.    Psychiatric/Behavioral: Negative.         /64 (BP Location: Left arm, Patient Position: Sitting)   Pulse 74   Temp 36.1 °C (96.9 °F) (Temporal)   Resp 13   Wt 68.5 kg (151 lb)   LMP  (LMP Unknown)   SpO2 98%   BMI 24.37 kg/m²     Physical Exam  Constitutional:       Appearance: Normal appearance.   HENT:      Head: Normocephalic and atraumatic.   Eyes:      Extraocular Movements: Extraocular movements intact.      Pupils: Pupils are equal, round, and reactive to light.   Cardiovascular:      Rate and Rhythm: Normal rate and regular rhythm.   Pulmonary:      Effort: Pulmonary effort is normal.      Breath sounds: Normal breath sounds.   Abdominal:      General: Abdomen is flat. " Bowel sounds are normal.      Palpations: Abdomen is soft.   Musculoskeletal:         General: Normal range of motion.   Skin:     General: Skin is warm and dry.      Capillary Refill: Capillary refill takes less than 2 seconds.   Neurological:      General: No focal deficit present.      Mental Status: She is alert and oriented to person, place, and time.   Psychiatric:         Mood and Affect: Mood normal.         Behavior: Behavior normal.         The complexity of medical decision making for this patient's transitional care is moderate.    Assessment/Plan   Problem List Items Addressed This Visit             ICD-10-CM    Cerebrovascular accident (CVA) due to occlusion of precerebral artery (Multi) - Primary I63.20     S/p TPA for CVA 7/17-19/2024. Doing well since discharge home  No residual effects    Continue atorvastatin 80mg daily; stop simvastatin 20mg   Continue clopidogrel 75mg daily and ASA 81mg daily  Referred to neurology with Jazmin Iniguez NP for further follow up         Irregular heart beat I49.9     Irregularly irregular heart beat auscultated on office today  EKG in office with sheree    Continue current regimen  Follow up with cardiology as scheduled         Relevant Orders    ECG 12 lead (Clinic Performed) (Completed)     Other Visit Diagnoses         Codes    Cerebrovascular accident (CVA), unspecified mechanism (Multi)     I63.9    Relevant Medications    atorvastatin (Lipitor) 80 mg tablet    Other Relevant Orders    Referral to Neurology    ECG 12 lead (Clinic Performed) (Completed)            Follow up with Dr. Hyde as previously scheduled    Alexandria MCCALL-CNS

## 2024-07-31 NOTE — ASSESSMENT & PLAN NOTE
S/p TPA for CVA 7/17-19/2024. Doing well since discharge home  No residual effects    Continue atorvastatin 80mg daily; stop simvastatin 20mg   Continue clopidogrel 75mg daily and ASA 81mg daily  Referred to neurology with Jazmin Iniguez NP for further follow up

## 2024-08-14 ENCOUNTER — PATIENT OUTREACH (OUTPATIENT)
Dept: CARE COORDINATION | Facility: CLINIC | Age: 84
End: 2024-08-14
Payer: MEDICARE

## 2024-08-19 ENCOUNTER — TELEPHONE (OUTPATIENT)
Dept: PRIMARY CARE | Facility: CLINIC | Age: 84
End: 2024-08-19
Payer: MEDICARE

## 2024-08-19 DIAGNOSIS — Z86.73 HISTORY OF STROKE: Primary | ICD-10-CM

## 2024-08-19 RX ORDER — CLOPIDOGREL BISULFATE 75 MG/1
75 TABLET ORAL DAILY
Qty: 30 TABLET | Refills: 0 | Status: SHIPPED | OUTPATIENT
Start: 2024-08-19 | End: 2024-09-18

## 2024-08-19 RX ORDER — CLOPIDOGREL BISULFATE 75 MG/1
75 TABLET ORAL DAILY
Qty: 90 TABLET | Refills: 3 | Status: SHIPPED | OUTPATIENT
Start: 2024-08-19 | End: 2025-08-19

## 2024-08-19 NOTE — TELEPHONE ENCOUNTER
Pt called stating she was given clopidogrel (Plavix) 75 mg tablet while in the hospital.  She is out today, we received a refill request from [facility] but it will be a few days before the medication will reach her through the mail.  Pt is asking if Dr Hyde will send a refill to a local pharmacy, or is it ok to go without for a few days?    Shakir Chamberlain in [place].

## 2024-08-27 ENCOUNTER — OFFICE VISIT (OUTPATIENT)
Dept: NEUROLOGY | Facility: HOSPITAL | Age: 84
End: 2024-08-27
Payer: MEDICARE

## 2024-08-27 VITALS
WEIGHT: 149.9 LBS | HEART RATE: 74 BPM | SYSTOLIC BLOOD PRESSURE: 156 MMHG | DIASTOLIC BLOOD PRESSURE: 90 MMHG | BODY MASS INDEX: 24.19 KG/M2

## 2024-08-27 DIAGNOSIS — I63.9 STROKE ABORTED BY ADMINISTRATION OF THROMBOLYTIC AGENT (MULTI): Primary | ICD-10-CM

## 2024-08-27 DIAGNOSIS — I63.9 CEREBROVASCULAR ACCIDENT (CVA), UNSPECIFIED MECHANISM (MULTI): ICD-10-CM

## 2024-08-27 PROBLEM — Z86.73 HISTORY OF STROKE: Status: RESOLVED | Noted: 2024-08-19 | Resolved: 2024-08-27

## 2024-08-27 PROCEDURE — 1157F ADVNC CARE PLAN IN RCRD: CPT | Performed by: NURSE PRACTITIONER

## 2024-08-27 PROCEDURE — 1126F AMNT PAIN NOTED NONE PRSNT: CPT | Performed by: NURSE PRACTITIONER

## 2024-08-27 PROCEDURE — 3080F DIAST BP >= 90 MM HG: CPT | Performed by: NURSE PRACTITIONER

## 2024-08-27 PROCEDURE — 99213 OFFICE O/P EST LOW 20 MIN: CPT | Performed by: NURSE PRACTITIONER

## 2024-08-27 PROCEDURE — 3077F SYST BP >= 140 MM HG: CPT | Performed by: NURSE PRACTITIONER

## 2024-08-27 PROCEDURE — 1160F RVW MEDS BY RX/DR IN RCRD: CPT | Performed by: NURSE PRACTITIONER

## 2024-08-27 PROCEDURE — 1159F MED LIST DOCD IN RCRD: CPT | Performed by: NURSE PRACTITIONER

## 2024-08-27 PROCEDURE — 1036F TOBACCO NON-USER: CPT | Performed by: NURSE PRACTITIONER

## 2024-08-27 ASSESSMENT — PAIN SCALES - GENERAL: PAINLEVEL: 0-NO PAIN

## 2024-08-27 ASSESSMENT — ENCOUNTER SYMPTOMS
OCCASIONAL FEELINGS OF UNSTEADINESS: 0
LOSS OF SENSATION IN FEET: 0
DEPRESSION: 0

## 2024-08-27 NOTE — PROGRESS NOTES
[facility]    SUBJECTIVE    Christine Horowitz is a 83 y.o. right-handed female who presents with   Chief Complaint   Patient presents with    Stroke        Presents for follow up visit  Visit type: in-clinic visit    HISTORY OF PRESENT ILLNESS    RECAP:  Pt was admitted to [facility] from 7/17/24 - 7/19/24 for dizziness. Seen inpatient by Dr. Amador and myself.    PMH: HTN, mild CAD, and DLD     Presented for dizziness. Pt reports that she was at home on the phone with her granddaughter when she suddenly was very dizzy. Describes as vertigo. States also feeling hot and cold. Denies any HA, difficulty speaking, dysarthria, vision loss, weakness or numbness tingling. When in the ED, was noted to have truncal ataxia and was unable to even sit up. Physician noted bilateral horizontal inexhaustible nystagmus, rotary nystagmus and +skew. NIHSS was zero. She was found to be candidate for TNK and it was given. Is on 81 mg aspirin daily and simvastatin outpatient.    Testing showed:  HCT unremarkable  CTA head/neck with moderate focal stenosis of L M2 segment  Lipid panel with LDL 98, CHOL 195,   A1C 5.9%  TTE with normal EF, moderately dilated LA size, negative bubble study.  MRI brain wo contrast s/p TNK without any stroke findings.     Plan established including:  Suspected posterior stroke, aborted by TNK  S/p TNK  Dizziness, truncal ataxia, nystagmus - all resolved  Started on DAPT x 21 days then stop Plavix and stay on aspirin daily. Continue HI statin daily. Holter x 2 weeks.     08/27/24 - Presents alone for today's visit.     Zio Patch results reviewed - no afib, some PVCs.     No bleeding issues on Plavix. No leg cramping on atorvastatin.      Doing well overall. Reports no further dizziness episodes since discharge and no new stroke-like symptoms.     REVIEW OF SYSTEMS:  10 point review of systems performed and is negative except as noted in the HPI.     Past Medical History:    Diagnosis Date    Personal history of other diseases of the digestive system     History of IBS    Personal history of other diseases of the nervous system and sense organs 12/15/2021    History of acute otitis externa       No relevant family history has been documented for this patient.    Past Surgical History:   Procedure Laterality Date    BLADDER SURGERY  05/12/2016    Bladder Surgery    HYSTERECTOMY  05/12/2016    Hysterectomy       Social History     Substance and Sexual Activity   Drug Use Never     Tobacco Use: Low Risk  (8/27/2024)    Patient History     Smoking Tobacco Use: Never     Smokeless Tobacco Use: Never     Passive Exposure: Not on file     Alcohol Use: Not At Risk (7/31/2024)    AUDIT-C     Frequency of Alcohol Consumption: Never     Average Number of Drinks: Patient does not drink     Frequency of Binge Drinking: Never       Current Outpatient Medications   Medication Instructions    amLODIPine (NORVASC) 5 mg, oral, Daily    atorvastatin (LIPITOR) 80 mg, oral, Nightly    biotin 5 mg, oral, Daily    clopidogrel (PLAVIX) 75 mg, oral, Daily    clopidogrel (PLAVIX) 75 mg, oral, Daily    cyanocobalamin (Vitamin B-12) 100 mcg tablet 1 tablet, oral, Daily    FLUoxetine (PROZAC) 20 mg, oral, Daily    irbesartan (AVAPRO) 150 mg, oral, Daily    metoprolol succinate XL (TOPROL-XL) 25 mg, oral, Daily    multivitamin with minerals (multivitamin-iron-folic acid) tablet 1 tablet, oral, Daily    oxybutynin (DITROPAN) 5 mg, oral, 2 times daily          OBJECTIVE    /90   Pulse 74   Wt 68 kg (149 lb 14.4 oz)   BMI 24.19 kg/m²       Physical Exam  Psychiatric:         Speech: Speech normal.       Neurological Exam  Mental Status  Awake, alert and oriented to person, place and time. Recent and remote memory are intact. Speech is normal. Language is fluent with no aphasia. Attention and concentration are normal. Fund of knowledge is appropriate for level of education.    Cranial Nerves  CN II-XII  grossly intact.    Motor  Normal muscle bulk throughout. No fasciculations present. Normal muscle tone. No abnormal involuntary movements.  Strength at least antigravity throughout.    Sensory  Light touch is normal in upper and lower extremities.     Coordination  Right: Finger-to-nose normal.Left: Finger-to-nose normal.    Gait  Casual gait is normal including stance, stride, and arm swing.        MR brain wo IV contrast 07/18/2024    Narrative  Interpreted By:  Renny King,  STUDY:  MR BRAIN WO IV CONTRAST;  7/18/2024 4:45 pm    INDICATION:  Signs/Symptoms:CVA, ataia.    COMPARISON:  CT head from 07/17/2020.    ACCESSION NUMBER(S):  [id number]    ORDERING CLINICIAN:  LATESHA GONZÁLES    TECHNIQUE:  Axial T2, FLAIR, DWI, gradient echo T2 and sagittal and coronal T1  weighted images of brain were acquired.    FINDINGS:  CSF Spaces: The ventricles, sulci and basal cisterns are prominent  size due to age related diffuse cerebral volume loss. There is no  abnormal extra-axial fluid collection.    Parenchyma: There is no diffusion restriction abnormality to suggest  acute infarct. There are multiple regions of T2 hyperintensity within  the bilateral cerebral hemispheric white matter which are probably  sequela of chronic small vessel ischemic changes. There is no mass  effect or midline shift.    Paranasal Sinuses and Mastoids: Visualized paranasal sinuses are  essentially clear. There is partial opacification of the bilateral  mastoid air cells with nonspecific fluid.    Impression  1. No acute intracranial abnormality or mass effect.    2. Findings of probable chronic small vessel ischemic changes.    This study was interpreted at [facility].    MACRO:  None    Signed by: Renny King 7/18/2024 6:02 PM  Dictation workstation:   [id number]      Lab Results   Component Value Date    WBC 5.1 07/19/2024    RBC 4.48 07/19/2024    HGB 13.6 07/19/2024    HCT 40.2 07/19/2024      07/19/2024     07/19/2024    K 3.8 07/19/2024     07/19/2024    BUN 17 07/19/2024    CREATININE 0.75 07/19/2024    EGFR 79 07/19/2024    CALCIUM 8.8 07/19/2024    ALKPHOS 48 07/17/2024    AST 25 07/17/2024    ALT 17 07/17/2024    MG 2.04 07/19/2024    HGBA1C 5.9 (H) 07/17/2024    LDLCALC 98 07/17/2024    CHOL 195 07/17/2024    HDL 61.1 07/17/2024    TRIG 179 (H) 07/17/2024          ASSESSMENT & PLAN  Problem List Items Addressed This Visit       Stroke aborted by administration of thrombolytic agent (Multi) - Primary    Overview     July 2024 - suspected posterior circulation stroke aborted by TNK administration  Sx: dizziness, truncal ataxia, nystagmus  Was on aspirin 81 mg daily  Now on Plavix 75 mg daily.   On Atorvastatin 80 mg daily now (was simvastatin)  Holter without any afib         Current Assessment & Plan     Continue Plavix and HI statin daily  Continue to monitor and manage stroke risks with PCP - HTN, CAD and DLD          Other Visit Diagnoses       Cerebrovascular accident (CVA), unspecified mechanism (Multi)                Call 911 if symptoms of TIA/Stroke occur.  Medications: clopidogrel 75 mg orally every day  Risk Factor Management: Hypertension target range 130-140/70-80  Lipid range - LDL < 100 and checked every 6 months, fasting      FU prn       Radha Iniguez APRN-CNP

## 2024-08-27 NOTE — PATIENT INSTRUCTIONS
"Stroke/TIA:  1. Continue your daily Plavix and statin medication.   Plavix is not a blood thinner but rather a platelet inhibitor medication. Platelets are the component in your blood that causes it to clot. When a person has a stroke, platelets clump together and form clots inside of narrowed arteries, blocking blood flow in the brain. Antiplatelet therapy helps prevent new clots from developing and is beneficial for the treatment of acute ischemic stroke, they do NOT dissolve clots that are already present    2. You have had a stroke. Strokes can happen when either: an artery going to the brain gets clogged or closes off, and part of the brain goes without blood for too long (ischemic stroke); or, an artery breaks open and starts bleeding into or around the brain (hemorrhagic stroke). During recovery, people work to regain some of the abilities they lost. Even though a part of their brain was damaged by the stroke, their brain might be able re-learn how to do some or all of the things it used to do. In general, the majority of a person's recovery happens in the first three to six months after the stroke; after this, improvements in physical and mental function can still happen, but progress tends to slow down. After about 1.5-2 years, what symptoms that are left over are likely your new normal.    TIA stands for \"transient ischemic attack.\" This is what people call a mini-stroke. It is like a stroke, but does not cause permanent damage to the brain. TIAs happen when an artery in the brain gets clogged or closes off, then reopens on its own. This can happen if a blood clot forms and then moves away or dissolves.Even though TIAs do not cause lasting symptoms, they are serious. If you have a TIA, you are at high risk of having a stroke. It's important to see a doctor and take steps to prevent that from happening. Do not ignore the symptoms of a stroke even if they go away!    3.The Acronym \"BE FAST\" can help you " remember stroke symptoms to watch out for:  Balance - Is the person having trouble standing or walking?  Eyes - Is the person having trouble with their vision?  Face - Does the person's face look uneven or droop on 1 side?  Arm - Does the person have weakness or numbness in 1 or both arms? Does 1 arm drift down if they try to hold both arms out?  Speech - Is the person having trouble speaking? Does their speech sound strange?  Time - If you notice any of these stroke signs, call for an ambulance (call [id number]). You need to act FAST. The sooner treatment begins, the better the chances of recovery.    A stroke caused by bleeding in the brain can also cause a sudden, severe headache.    4. It is important to control your stroke risk factors. These risk factors can increase the risk of stroke but strokes can still happen in people who do not know they are at risk or do not have risk factors.     Ischemic stroke risk factors include the following: age older than 40 years, heart disease, high blood pressure, smoking, diabetes, high cholesterol, illegal drug use, recent childbirth, previous TIA history, lack of exercise, obesity, current or past history of blood clots, and family history of heart disease and/or stroke.     Hemorrhagic stroke risk factors include the following: high blood pressure, smoking, illegal drug use, and use of blood thinners.

## 2024-08-27 NOTE — ASSESSMENT & PLAN NOTE
Continue Plavix and HI statin daily  Continue to monitor and manage stroke risks with PCP - HTN, CAD and DLD  
- see above

## 2024-08-29 DIAGNOSIS — F32.5 DEPRESSION, MAJOR, IN REMISSION (CMS-HCC): Chronic | ICD-10-CM

## 2024-08-29 RX ORDER — FLUOXETINE HYDROCHLORIDE 20 MG/1
20 CAPSULE ORAL DAILY
Qty: 60 CAPSULE | Refills: 0 | Status: SHIPPED | OUTPATIENT
Start: 2024-08-29

## 2024-10-15 ENCOUNTER — PATIENT OUTREACH (OUTPATIENT)
Dept: PRIMARY CARE | Facility: CLINIC | Age: 84
End: 2024-10-15
Payer: MEDICARE

## 2024-10-20 DIAGNOSIS — F32.5 DEPRESSION, MAJOR, IN REMISSION (CMS-HCC): Chronic | ICD-10-CM

## 2024-10-21 RX ORDER — FLUOXETINE HYDROCHLORIDE 20 MG/1
20 CAPSULE ORAL DAILY
Qty: 90 CAPSULE | Refills: 3 | Status: SHIPPED | OUTPATIENT
Start: 2024-10-21

## 2024-10-28 PROBLEM — R73.03 PREDIABETES: Status: ACTIVE | Noted: 2024-10-28

## 2024-10-29 ENCOUNTER — LAB (OUTPATIENT)
Dept: LAB | Facility: LAB | Age: 84
End: 2024-10-29
Payer: MEDICARE

## 2024-10-29 ENCOUNTER — APPOINTMENT (OUTPATIENT)
Dept: PRIMARY CARE | Facility: CLINIC | Age: 84
End: 2024-10-29
Payer: MEDICARE

## 2024-10-29 VITALS
SYSTOLIC BLOOD PRESSURE: 136 MMHG | WEIGHT: 148 LBS | OXYGEN SATURATION: 97 % | BODY MASS INDEX: 23.78 KG/M2 | RESPIRATION RATE: 16 BRPM | HEART RATE: 56 BPM | TEMPERATURE: 97.3 F | HEIGHT: 66 IN | DIASTOLIC BLOOD PRESSURE: 72 MMHG

## 2024-10-29 DIAGNOSIS — E78.00 HYPERCHOLESTEROLEMIA: ICD-10-CM

## 2024-10-29 DIAGNOSIS — Z00.00 ROUTINE GENERAL MEDICAL EXAMINATION AT HEALTH CARE FACILITY: Primary | ICD-10-CM

## 2024-10-29 DIAGNOSIS — Z23 NEED FOR INFLUENZA VACCINATION: ICD-10-CM

## 2024-10-29 DIAGNOSIS — I10 PRIMARY HYPERTENSION: ICD-10-CM

## 2024-10-29 DIAGNOSIS — R73.03 PREDIABETES: ICD-10-CM

## 2024-10-29 DIAGNOSIS — F32.5 DEPRESSION, MAJOR, IN REMISSION (CMS-HCC): Chronic | ICD-10-CM

## 2024-10-29 PROBLEM — F32.9 MAJOR DEPRESSIVE DISORDER: Status: RESOLVED | Noted: 2024-07-31 | Resolved: 2024-10-29

## 2024-10-29 LAB
ALBUMIN SERPL BCP-MCNC: 4.2 G/DL (ref 3.4–5)
ALP SERPL-CCNC: 58 U/L (ref 33–136)
ALT SERPL W P-5'-P-CCNC: 22 U/L (ref 7–45)
ANION GAP SERPL CALC-SCNC: 9 MMOL/L (ref 10–20)
AST SERPL W P-5'-P-CCNC: 20 U/L (ref 9–39)
BILIRUB SERPL-MCNC: 0.8 MG/DL (ref 0–1.2)
BUN SERPL-MCNC: 15 MG/DL (ref 6–23)
CALCIUM SERPL-MCNC: 9.1 MG/DL (ref 8.6–10.3)
CHLORIDE SERPL-SCNC: 104 MMOL/L (ref 98–107)
CHOLEST SERPL-MCNC: 142 MG/DL (ref 0–199)
CHOLESTEROL/HDL RATIO: 2.2
CO2 SERPL-SCNC: 29 MMOL/L (ref 21–32)
CREAT SERPL-MCNC: 0.77 MG/DL (ref 0.5–1.05)
EGFRCR SERPLBLD CKD-EPI 2021: 76 ML/MIN/1.73M*2
ERYTHROCYTE [DISTWIDTH] IN BLOOD BY AUTOMATED COUNT: 13.5 % (ref 11.5–14.5)
EST. AVERAGE GLUCOSE BLD GHB EST-MCNC: 123 MG/DL
GLUCOSE SERPL-MCNC: 108 MG/DL (ref 74–99)
HBA1C MFR BLD: 5.9 %
HCT VFR BLD AUTO: 41.6 % (ref 36–46)
HDLC SERPL-MCNC: 63.9 MG/DL
HGB BLD-MCNC: 13.6 G/DL (ref 12–16)
LDLC SERPL CALC-MCNC: 50 MG/DL
MCH RBC QN AUTO: 30 PG (ref 26–34)
MCHC RBC AUTO-ENTMCNC: 32.7 G/DL (ref 32–36)
MCV RBC AUTO: 92 FL (ref 80–100)
NON HDL CHOLESTEROL: 78 MG/DL (ref 0–149)
NRBC BLD-RTO: 0 /100 WBCS (ref 0–0)
PLATELET # BLD AUTO: 245 X10*3/UL (ref 150–450)
POTASSIUM SERPL-SCNC: 4.4 MMOL/L (ref 3.5–5.3)
PROT SERPL-MCNC: 6.4 G/DL (ref 6.4–8.2)
RBC # BLD AUTO: 4.54 X10*6/UL (ref 4–5.2)
SODIUM SERPL-SCNC: 138 MMOL/L (ref 136–145)
TRIGL SERPL-MCNC: 142 MG/DL (ref 0–149)
VLDL: 28 MG/DL (ref 0–40)
WBC # BLD AUTO: 4.9 X10*3/UL (ref 4.4–11.3)

## 2024-10-29 PROCEDURE — 83036 HEMOGLOBIN GLYCOSYLATED A1C: CPT

## 2024-10-29 PROCEDURE — G0439 PPPS, SUBSEQ VISIT: HCPCS | Performed by: FAMILY MEDICINE

## 2024-10-29 PROCEDURE — 1170F FXNL STATUS ASSESSED: CPT | Performed by: FAMILY MEDICINE

## 2024-10-29 PROCEDURE — 1157F ADVNC CARE PLAN IN RCRD: CPT | Performed by: FAMILY MEDICINE

## 2024-10-29 PROCEDURE — 99213 OFFICE O/P EST LOW 20 MIN: CPT | Performed by: FAMILY MEDICINE

## 2024-10-29 PROCEDURE — 1126F AMNT PAIN NOTED NONE PRSNT: CPT | Performed by: FAMILY MEDICINE

## 2024-10-29 PROCEDURE — 85027 COMPLETE CBC AUTOMATED: CPT

## 2024-10-29 PROCEDURE — 90662 IIV NO PRSV INCREASED AG IM: CPT | Performed by: FAMILY MEDICINE

## 2024-10-29 PROCEDURE — 1160F RVW MEDS BY RX/DR IN RCRD: CPT | Performed by: FAMILY MEDICINE

## 2024-10-29 PROCEDURE — 80053 COMPREHEN METABOLIC PANEL: CPT

## 2024-10-29 PROCEDURE — G0008 ADMIN INFLUENZA VIRUS VAC: HCPCS | Performed by: FAMILY MEDICINE

## 2024-10-29 PROCEDURE — 80061 LIPID PANEL: CPT

## 2024-10-29 PROCEDURE — 1036F TOBACCO NON-USER: CPT | Performed by: FAMILY MEDICINE

## 2024-10-29 PROCEDURE — 1159F MED LIST DOCD IN RCRD: CPT | Performed by: FAMILY MEDICINE

## 2024-10-29 PROCEDURE — 36415 COLL VENOUS BLD VENIPUNCTURE: CPT

## 2024-10-29 PROCEDURE — 3078F DIAST BP <80 MM HG: CPT | Performed by: FAMILY MEDICINE

## 2024-10-29 PROCEDURE — 3075F SYST BP GE 130 - 139MM HG: CPT | Performed by: FAMILY MEDICINE

## 2024-10-29 RX ORDER — OXYBUTYNIN CHLORIDE 5 MG/1
5 TABLET ORAL 2 TIMES DAILY
Qty: 180 TABLET | Refills: 3 | Status: CANCELLED | OUTPATIENT
Start: 2024-10-29

## 2024-10-29 RX ORDER — CLOPIDOGREL BISULFATE 75 MG/1
75 TABLET ORAL DAILY
Qty: 90 TABLET | Refills: 3 | Status: CANCELLED | OUTPATIENT
Start: 2024-10-29 | End: 2025-10-29

## 2024-10-29 ASSESSMENT — LIFESTYLE VARIABLES
HOW MANY STANDARD DRINKS CONTAINING ALCOHOL DO YOU HAVE ON A TYPICAL DAY: 1 OR 2
HOW OFTEN DO YOU HAVE A DRINK CONTAINING ALCOHOL: 2-4 TIMES A MONTH
AUDIT-C TOTAL SCORE: 2
HOW OFTEN DO YOU HAVE SIX OR MORE DRINKS ON ONE OCCASION: NEVER
SKIP TO QUESTIONS 9-10: 1

## 2024-10-29 ASSESSMENT — ACTIVITIES OF DAILY LIVING (ADL)
BATHING: INDEPENDENT
GROCERY_SHOPPING: INDEPENDENT
DRESSING: INDEPENDENT
MANAGING_FINANCES: INDEPENDENT
TAKING_MEDICATION: INDEPENDENT
DOING_HOUSEWORK: INDEPENDENT

## 2024-10-29 ASSESSMENT — PATIENT HEALTH QUESTIONNAIRE - PHQ9
2. FEELING DOWN, DEPRESSED OR HOPELESS: NOT AT ALL
1. LITTLE INTEREST OR PLEASURE IN DOING THINGS: NOT AT ALL
SUM OF ALL RESPONSES TO PHQ9 QUESTIONS 1 & 2: 0

## 2024-10-29 ASSESSMENT — ENCOUNTER SYMPTOMS
LOSS OF SENSATION IN FEET: 0
DEPRESSION: 0
OCCASIONAL FEELINGS OF UNSTEADINESS: 1

## 2024-10-29 ASSESSMENT — PAIN SCALES - GENERAL: PAINLEVEL_OUTOF10: 0-NO PAIN

## 2024-12-04 ENCOUNTER — APPOINTMENT (OUTPATIENT)
Dept: CARDIOLOGY | Facility: CLINIC | Age: 84
End: 2024-12-04
Payer: MEDICARE

## 2024-12-04 NOTE — PROGRESS NOTES
"Primary Cardiologist: Dr. Centeno    Chief Complaint:   6 month follow up     History Of Present Illness:    Christine Horowitz is a 84 y.o. 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    Today, Christine Horowitz is feeling well, has no cardiac concerns, no recent hospitalizations.   Denies chest pain/pressure, no dizziness or lightheadedness, no shortness of breath, and no palpitations. She denies lower extremity edema, orthopnea or PND.     Last Recorded Vitals:  Visit Vitals  /68 (BP Location: Left arm, Patient Position: Sitting, BP Cuff Size: Adult)   Pulse 80   Ht 1.671 m (5' 5.8\")   Wt 68.9 kg (152 lb)   SpO2 93%   BMI 24.68 kg/m²   OB Status Menopausal   Smoking Status Never   BSA 1.79 m²        Past Medical History:  She has a past medical history of Personal history of other diseases of the digestive system and Personal history of other diseases of the nervous system and sense organs (12/15/2021).    Past Surgical History:  She has a past surgical history that includes Hysterectomy (05/12/2016) and Bladder surgery (05/12/2016).      Social History:  She reports that she has never smoked. She has never used smokeless tobacco. She reports current alcohol use of about 1.0 standard drink of alcohol per week. She reports that she does not use drugs.    Family History:  Family History   Problem Relation Name Age of Onset    No Known Problems Mother      No Known Problems Father          Allergies:  Lisinopril    Outpatient Medications:  Current Outpatient Medications   Medication Instructions    amLODIPine (NORVASC) 5 mg, oral, Daily    atorvastatin (LIPITOR) 80 mg, oral, Nightly    biotin 5 mg, Daily    clopidogrel (PLAVIX) 75 mg, oral, Daily    cyanocobalamin (Vitamin B-12) 100 mcg tablet 1 tablet, Daily    " FLUoxetine (PROZAC) 20 mg, oral, Daily    irbesartan (AVAPRO) 150 mg, oral, Daily    metoprolol succinate XL (TOPROL-XL) 25 mg, oral, Daily    multivitamin with minerals (multivitamin-iron-folic acid) tablet 1 tablet, Daily    oxybutynin (DITROPAN) 5 mg, oral, 2 times daily         Review of Systems   Constitutional: Positive for malaise/fatigue.   Cardiovascular:  Negative for chest pain, dyspnea on exertion, leg swelling, near-syncope, palpitations and syncope.   Respiratory:  Negative for shortness of breath.    Endocrine: Negative.    Hematologic/Lymphatic: Negative.    Musculoskeletal: Negative.    Gastrointestinal: Negative.    Genitourinary:  Positive for frequency.   Neurological:  Negative for dizziness and light-headedness.        Physical Exam  Vitals reviewed.   Constitutional:       Appearance: Normal appearance.   HENT:      Head: Normocephalic.      Mouth/Throat:      Mouth: Mucous membranes are moist.   Cardiovascular:      Rate and Rhythm: Normal rate and regular rhythm.      Pulses: Normal pulses.      Heart sounds: Normal heart sounds. No murmur heard.     No friction rub. No gallop.   Pulmonary:      Effort: Pulmonary effort is normal.      Breath sounds: Normal breath sounds. No wheezing, rhonchi or rales.   Abdominal:      General: Bowel sounds are normal.      Palpations: Abdomen is soft.   Musculoskeletal:         General: Normal range of motion.      Cervical back: Normal range of motion.      Right lower leg: No edema.      Left lower leg: No edema.   Skin:     General: Skin is warm and dry.   Neurological:      General: No focal deficit present.      Mental Status: She is alert and oriented to person, place, and time.   Psychiatric:         Mood and Affect: Mood normal.         Behavior: Behavior normal.         Thought Content: Thought content normal.         Judgment: Judgment normal.           Last Labs:  CBC -  Lab Results   Component Value Date    WBC 4.9 10/29/2024    HGB 13.6  10/29/2024    HCT 41.6 10/29/2024    MCV 92 10/29/2024     10/29/2024       CMP -  Lab Results   Component Value Date    CALCIUM 9.1 10/29/2024    PHOS 3.5 07/19/2024    PROT 6.4 10/29/2024    ALBUMIN 4.2 10/29/2024    AST 20 10/29/2024    ALT 22 10/29/2024    ALKPHOS 58 10/29/2024    BILITOT 0.8 10/29/2024       LIPID PANEL -   Lab Results   Component Value Date    CHOL 142 10/29/2024    TRIG 142 10/29/2024    HDL 63.9 10/29/2024    CHHDL 2.2 10/29/2024    LDLF 119 (H) 11/29/2022    VLDL 28 10/29/2024    NHDL 78 10/29/2024       RENAL FUNCTION PANEL -   Lab Results   Component Value Date    GLUCOSE 108 (H) 10/29/2024     10/29/2024    K 4.4 10/29/2024     10/29/2024    CO2 29 10/29/2024    ANIONGAP 9 (L) 10/29/2024    BUN 15 10/29/2024    CREATININE 0.77 10/29/2024    CALCIUM 9.1 10/29/2024    PHOS 3.5 07/19/2024    ALBUMIN 4.2 10/29/2024        Lab Results   Component Value Date     (H) 07/17/2024    HGBA1C 5.9 (H) 10/29/2024       Last Cardiology Tests:  ECG:  No results found for this or any previous visit from the past 1095 days.      Echo:  TTE 7/18/24:  Left ventricular ejection fraction is normal, calculated by Escamilla's biplane at 63%.   2. Spectral Doppler shows a pseudonormal pattern of left ventricular diastolic filling.   3. There is normal right ventricular global systolic function.   4. The left atrium is moderately dilated.   5. Mildly elevated RVSP.  Ejection Fractions:  EF   Date/Time Value Ref Range Status   07/18/2024 08:30 AM 63 %        Cath:  No results found for this or any previous visit from the past 1095 days.      Stress Test:      Cardiac Imaging:  Holter monitor: Patient was monitored for 13 days and 1 hour starting on 7/19/2024 and ending on 8-24: Patient had a minimum heart rate of 40 bpm, maximum heart rate of 194 bpm, and an average heart rate of 60 bpm.  Predominant underlying rhythm was sinus rhythm.  16 SVT runs occurred, the run with the fastest  interval lasting 4 beats with a max rate of 194 bpm, the longest lasting 5 beats with an average rate of 103 bpm.  Isolated SVE's were occasional, SVE couplets were rare and SVE triplets were rare.  Isolated VE's were frequent with a 15.6% burden, VE couplets were rare and VE triplets were rare.  Ventricular bigeminy and trigeminy were present.      Lab review: I have personally reviewed the laboratory result(s)     Assessment/Plan     Christine Horowitz is a 84 y.o. 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    CAD  CT calcium score with mild disease (2013)  Today, denies chest pain or pressure, no shortness of breath  Patient with frequent PVCs (15% burden) on recent Holter (placed by Neurology), denies palpitations.   Today, she denies chest pain or pressure, no shortness of breath. Reports she goes swimming most days of week for exercise without difficulty.  We discussed stress test in setting of PVCs on Holter from July, she would not like to perform at this time and will monitor symptoms.   LDL 50, at goal, continue on   Blood pressure is well controlled  Continue on Clopidogrel (stroke), atorvastatin 80 mg daily, metoprolol succinate 25 mg daily    Hypertension  Today's /68  Well controlled  Continue on current antihypertensives: Amlodipine 5 mg daily, Irbesartan 150 mg daily, metoprolol succinate 25 mg daily.    MVP  TTE 7/24 with mild MR  No HF symptoms today    PVCs/pSVT  Holter 7/2024: 16 SVT runs occurred, the run with the fastest interval lasting 4 beats with a max rate of 194 bpm, the longest lasting 5 beats with an average rate of 103 bpm.  Isolated SVE's were occasional, SVE couplets were rare and SVE triplets were rare.  Isolated VE's were frequent with a 15.6% burden, VE couplets were rare and VE  triplets were rare.  Ventricular bigeminy and trigeminy were present.  Today, denies palpitations.  Continue on metoprolol XL 25 mg daily    History of recent stroke s/p TPA  No Afib on monitor  Today, reports no residual symptoms from stroke  Continue on clopidogrel, statin        Aubrie Garcia, APRN-CNP

## 2024-12-05 ENCOUNTER — APPOINTMENT (OUTPATIENT)
Dept: CARDIOLOGY | Facility: HOSPITAL | Age: 84
End: 2024-12-05
Payer: MEDICARE

## 2024-12-05 VITALS
BODY MASS INDEX: 24.43 KG/M2 | SYSTOLIC BLOOD PRESSURE: 120 MMHG | OXYGEN SATURATION: 93 % | WEIGHT: 152 LBS | DIASTOLIC BLOOD PRESSURE: 68 MMHG | HEART RATE: 80 BPM | HEIGHT: 66 IN

## 2024-12-05 DIAGNOSIS — I25.10 CORONARY ARTERY DISEASE INVOLVING NATIVE CORONARY ARTERY OF NATIVE HEART, UNSPECIFIED WHETHER ANGINA PRESENT: ICD-10-CM

## 2024-12-05 DIAGNOSIS — I10 PRIMARY HYPERTENSION: Primary | ICD-10-CM

## 2024-12-05 DIAGNOSIS — I49.9 IRREGULAR HEART BEAT: ICD-10-CM

## 2024-12-05 DIAGNOSIS — I49.3 PVC (PREMATURE VENTRICULAR CONTRACTION): ICD-10-CM

## 2024-12-05 DIAGNOSIS — I49.8 SINUS ARRHYTHMIA: ICD-10-CM

## 2024-12-05 DIAGNOSIS — I25.10 CORONARY ARTERY DISEASE INVOLVING NATIVE CORONARY ARTERY OF NATIVE HEART WITHOUT ANGINA PECTORIS: ICD-10-CM

## 2024-12-05 DIAGNOSIS — I50.9 CONGESTIVE HEART FAILURE, UNSPECIFIED HF CHRONICITY, UNSPECIFIED HEART FAILURE TYPE: ICD-10-CM

## 2024-12-05 DIAGNOSIS — E78.00 HYPERCHOLESTEROLEMIA: ICD-10-CM

## 2024-12-05 PROCEDURE — 99214 OFFICE O/P EST MOD 30 MIN: CPT | Performed by: CLINICAL NURSE SPECIALIST

## 2024-12-05 PROCEDURE — 1157F ADVNC CARE PLAN IN RCRD: CPT | Performed by: CLINICAL NURSE SPECIALIST

## 2024-12-05 PROCEDURE — 1036F TOBACCO NON-USER: CPT | Performed by: CLINICAL NURSE SPECIALIST

## 2024-12-05 PROCEDURE — 3078F DIAST BP <80 MM HG: CPT | Performed by: CLINICAL NURSE SPECIALIST

## 2024-12-05 PROCEDURE — G2211 COMPLEX E/M VISIT ADD ON: HCPCS | Performed by: CLINICAL NURSE SPECIALIST

## 2024-12-05 PROCEDURE — 1160F RVW MEDS BY RX/DR IN RCRD: CPT | Performed by: CLINICAL NURSE SPECIALIST

## 2024-12-05 PROCEDURE — 1159F MED LIST DOCD IN RCRD: CPT | Performed by: CLINICAL NURSE SPECIALIST

## 2024-12-05 PROCEDURE — 3074F SYST BP LT 130 MM HG: CPT | Performed by: CLINICAL NURSE SPECIALIST

## 2024-12-05 ASSESSMENT — ENCOUNTER SYMPTOMS
NEAR-SYNCOPE: 0
GASTROINTESTINAL NEGATIVE: 1
PALPITATIONS: 0
DYSPNEA ON EXERTION: 0
MUSCULOSKELETAL NEGATIVE: 1
LIGHT-HEADEDNESS: 0
HEMATOLOGIC/LYMPHATIC NEGATIVE: 1
ENDOCRINE NEGATIVE: 1
SYNCOPE: 0
SHORTNESS OF BREATH: 0
FREQUENCY: 1
DIZZINESS: 0

## 2024-12-05 NOTE — PATIENT INSTRUCTIONS
Please call our office or go to ED if you experience any chest pain/pressure, worsening shortness of breath.   Call our office with any new cardiac concerns  Continue current medications  Continue heart-healthy diet. A diet low in sodium, low in cholesterol, limiting red meats and eating whole foods.   Follow up with Dr. Centeno in 6  months

## 2024-12-08 DIAGNOSIS — N32.81 OAB (OVERACTIVE BLADDER): ICD-10-CM

## 2024-12-09 RX ORDER — OXYBUTYNIN CHLORIDE 5 MG/1
5 TABLET ORAL 2 TIMES DAILY
Qty: 180 TABLET | Refills: 3 | Status: SHIPPED | OUTPATIENT
Start: 2024-12-09

## 2025-06-08 DIAGNOSIS — Z86.73 HISTORY OF STROKE: ICD-10-CM

## 2025-06-09 NOTE — PROGRESS NOTES
Chief Complaint:   No chief complaint on file.     History Of Present Illness:    Christine Horowitz is a 84 y.o. female presenting for yearly follow up.  H/o suspected stroke that was aborted by TNK (7/2024), frequent PVCs and pSVT (Holter 7/2024), hypertension, mitral valve prolapse and mild CAD by cardiac CTA in 2013.    Last seen by CHRIS Garcia in 12/2024. At the time, Pt denied cardiac concerns, and was doing well.    ROS:  The remainder of the review of systems was obtained, as was negative as pertains to the chief complaint.    CV testing and labs reviewed:  EKG today personally reviewed and demonstrated ***     Labs 10/2024:  K 4.4  BUN 15  Cr 0.77  ALT 22  AST 20   HgA1C 5.9  H&H 13.6 41.6  Lipid labs 10/2024:    HDL 63.9 LDL 50      TTE 7/18/24:  Left ventricular ejection fraction is normal, calculated by Escamilla's biplane at 63%.   2. Spectral Doppler shows a pseudonormal pattern of left ventricular diastolic filling.   3. There is normal right ventricular global systolic function.   4. The left atrium is moderately dilated.   5. Mildly elevated RVSP.    Holter monitor: Patient was monitored for 13 days and 1 hour starting on 7/19/2024 and ending on 8-24: Patient had a minimum heart rate of 40 bpm, maximum heart rate of 194 bpm, and an average heart rate of 60 bpm. Predominant underlying rhythm was sinus rhythm. 16 SVT runs occurred, the run with the fastest interval lasting 4 beats with a max rate of 194 bpm, the longest lasting 5 beats with an average rate of 103 bpm. Isolated SVE's were occasional, SVE couplets were rare and SVE triplets were rare. Isolated VE's were frequent with a 15.6% burden, VE couplets were rare and VE triplets were rare. Ventricular bigeminy and trigeminy were present.     Last Recorded Vitals:  There were no vitals filed for this visit.      Past Medical History:  She has a past medical history of Personal history of other diseases of the digestive system and  Personal history of other diseases of the nervous system and sense organs (12/15/2021).    Past Surgical History:  She has a past surgical history that includes Hysterectomy (05/12/2016) and Bladder surgery (05/12/2016).      Social History:  She reports that she has never smoked. She has never used smokeless tobacco. She reports current alcohol use of about 1.0 standard drink of alcohol per week. She reports that she does not use drugs.    Family History:  Family History   Problem Relation Name Age of Onset    No Known Problems Mother      No Known Problems Father          Allergies:  Lisinopril    Outpatient Medications:  Current Outpatient Medications   Medication Instructions    amLODIPine (NORVASC) 5 mg, oral, Daily    atorvastatin (LIPITOR) 80 mg, oral, Nightly    biotin 5 mg, Daily    clopidogrel (PLAVIX) 75 mg, oral, Daily    cyanocobalamin (Vitamin B-12) 100 mcg tablet 1 tablet, Daily    FLUoxetine (PROZAC) 20 mg, oral, Daily    irbesartan (AVAPRO) 150 mg, oral, Daily    metoprolol succinate XL (TOPROL-XL) 25 mg, oral, Daily    multivitamin with minerals (multivitamin-iron-folic acid) tablet 1 tablet, Daily    oxyBUTYnin (DITROPAN) 5 mg, oral, 2 times daily       Physical Exam:  Physical Exam  HENT:      Head: Normocephalic.      Nose: Nose normal.      Mouth/Throat:      Mouth: Mucous membranes are moist.   Cardiovascular:      Rate and Rhythm: Normal rate and regular rhythm.      Heart sounds: S1 normal and S2 normal.      Comments: No carotid bruits  Occasional premature heart beats.  Pulmonary:      Effort: Pulmonary effort is normal.      Breath sounds: Normal breath sounds.   Abdominal:      Palpations: Abdomen is soft.   Musculoskeletal:         General: Normal range of motion.      Cervical back: Normal range of motion.   Skin:     General: Skin is warm and dry.   Neurological:      General: No focal deficit present.      Mental Status: She is alert.   Psychiatric:         Mood and Affect: Mood  normal.        Last Labs:  CBC -  Lab Results   Component Value Date    WBC 4.9 10/29/2024    HGB 13.6 10/29/2024    HCT 41.6 10/29/2024    MCV 92 10/29/2024     10/29/2024       CMP -  Lab Results   Component Value Date    CALCIUM 9.1 10/29/2024    PHOS 3.5 07/19/2024    PROT 6.4 10/29/2024    ALBUMIN 4.2 10/29/2024    AST 20 10/29/2024    ALT 22 10/29/2024    ALKPHOS 58 10/29/2024    BILITOT 0.8 10/29/2024       LIPID PANEL -   Lab Results   Component Value Date    CHOL 142 10/29/2024    TRIG 142 10/29/2024    HDL 63.9 10/29/2024    CHHDL 2.2 10/29/2024    LDLF 119 (H) 11/29/2022    VLDL 28 10/29/2024    NHDL 78 10/29/2024       RENAL FUNCTION PANEL -   Lab Results   Component Value Date    GLUCOSE 108 (H) 10/29/2024     10/29/2024    K 4.4 10/29/2024     10/29/2024    CO2 29 10/29/2024    ANIONGAP 9 (L) 10/29/2024    BUN 15 10/29/2024    CREATININE 0.77 10/29/2024    CALCIUM 9.1 10/29/2024    PHOS 3.5 07/19/2024    ALBUMIN 4.2 10/29/2024        Lab Results   Component Value Date     (H) 07/17/2024    HGBA1C 5.9 (H) 10/29/2024     Assessment/Plan   CAD: mild CAD by CT calcium score in 2013.   Patient with frequent PVCs (15% burden) on recent Holter (placed by Neurology), denies palpitations.   She denies any angina and remains physically active.  Last FLP 10/24 LDL 50 - at goal.  -continue ASA, clopidogrel (stroke), atorvastatin 80 mg daily, metoprolol succinate 25 mg daily   -should have rpt FLP, AST/ALT later this year    PVCs/pSVT  Holter 7/2024: 16 SVT runs occurred, the run with the fastest interval lasting 4 beats with a max rate of 194 bpm, the longest lasting 5 beats with an average rate of 103 bpm.  Isolated SVE's were occasional, SVE couplets were rare and SVE triplets were rare.  Isolated VE's were frequent with a 15.6% burden, VE couplets were rare and VE triplets were rare.  Ventricular bigeminy and trigeminy were present.  Today, denies palpitations.  -continue on metoprolol  XL 25 mg daily     MV prolapse:   Denies HF sx.   Last TTE in TTE 7/24 with mild MR  -serial monitoring clinically  -will repeat an echo if she has symptoms    H/o stroke s/p TPA  No Afib on monitor  Today, reports no residual symptoms from stroke  -continue on clopidogrel, statin    HTN: reviewed her BP trend, elevated as high at 150/90's  Today's BP ***  -continue amlodipine 5mg daily  -irbesartan 150mg daily  -metoprolol succinate 25mg daily    Scribe Attestation  By signing my name below, Kiera LIGHT, [employer]   attest that this documentation has been prepared under the direction and in the presence of Harika Centeno MD.

## 2025-06-10 ENCOUNTER — TELEPHONE (OUTPATIENT)
Dept: CARDIOLOGY | Facility: HOSPITAL | Age: 85
End: 2025-06-10

## 2025-06-10 ENCOUNTER — APPOINTMENT (OUTPATIENT)
Dept: CARDIOLOGY | Facility: HOSPITAL | Age: 85
End: 2025-06-10
Payer: MEDICARE

## 2025-06-10 RX ORDER — CLOPIDOGREL BISULFATE 75 MG/1
75 TABLET ORAL DAILY
Qty: 90 TABLET | Refills: 3 | Status: SHIPPED | OUTPATIENT
Start: 2025-06-10

## 2025-06-10 NOTE — TELEPHONE ENCOUNTER
Patient came in for refill of Amlodipine 5 mg mg  Daily.  Please send to [facility] ([email or web address]       Patient came in for refill of Irbesartan 150 mg Daily.  Please send to [facility] ([email or web address]).      Patient came in for refill of Metoprolol succinate 25 mg Daily.  Please send to [facility] ([email or web address]).      Patient has an appointment with Dr. Centeno on Thursday 6/12/2025    Thank you.

## 2025-06-10 NOTE — PROGRESS NOTES
Chief Complaint:   No chief complaint on file.     History Of Present Illness:    Christine Horowitz is a 84 y.o. female presenting for yearly follow up.  H/o suspected stroke that was aborted by [facility] (7/2024), frequent PVCs and pSVT (Holter 7/2024), hypertension, mitral valve prolapse and mild CAD by cardiac CTA in 2013.    Last seen by CHRIS Garcia in 12/2024. At the time, Pt denied cardiac concerns, and was doing well.    Today, Pt is doing well from cardiovascular perspective.  Denies chest pain/pressure, SOB, dyspnea on exertion, LE edema, orthopnea, PND, palpitations, LH/dizziness, presyncope, overt syncope.  Compliant with home cardiac medications as prescribed. She would like refills today. She c/o worsening OA, had to cut off her wedding rings which made her sad.     ROS:  The remainder of the review of systems was obtained, as was negative as pertains to the chief complaint.    CV testing and labs reviewed:  EKG today personally reviewed and demonstrated NSR, HR 60 bpm     Labs 10/2024:  K 4.4  BUN 15  Cr 0.77  ALT 22  AST 20   HgA1C 5.9  H&H 13.6 41.6  Lipid labs 10/2024:    HDL 63.9 LDL 50      TTE 7/18/24:  Left ventricular ejection fraction is normal, calculated by Escamilla's biplane at 63%.   2. Spectral Doppler shows a pseudonormal pattern of left ventricular diastolic filling.   3. There is normal right ventricular global systolic function.   4. The left atrium is moderately dilated.   5. Mildly elevated RVSP.    Holter monitor: Patient was monitored for 13 days and 1 hour starting on 7/19/2024 and ending on 8-24: Patient had a minimum heart rate of 40 bpm, maximum heart rate of 194 bpm, and an average heart rate of 60 bpm. Predominant underlying rhythm was sinus rhythm. 16 SVT runs occurred, the run with the fastest interval lasting 4 beats with a max rate of 194 bpm, the longest lasting 5 beats with an average rate of 103 bpm. Isolated SVE's were occasional, SVE couplets were rare and SVE  triplets were rare. Isolated VE's were frequent with a 15.6% burden, VE couplets were rare and VE triplets were rare. Ventricular bigeminy and trigeminy were present.     Last Recorded Vitals:  There were no vitals filed for this visit.      Past Medical History:  She has a past medical history of Personal history of other diseases of the digestive system and Personal history of other diseases of the nervous system and sense organs (12/15/2021).    Past Surgical History:  She has a past surgical history that includes Hysterectomy (05/12/2016) and Bladder surgery (05/12/2016).      Social History:  She reports that she has never smoked. She has never used smokeless tobacco. She reports current alcohol use of about 1.0 standard drink of alcohol per week. She reports that she does not use drugs.    Family History:  Family History   Problem Relation Name Age of Onset    No Known Problems Mother      No Known Problems Father          Allergies:  Lisinopril    Outpatient Medications:  Current Outpatient Medications   Medication Instructions    amLODIPine (NORVASC) 5 mg, oral, Daily    atorvastatin (LIPITOR) 80 mg, oral, Nightly    biotin 5 mg, Daily    clopidogrel (PLAVIX) 75 mg, oral, Daily    cyanocobalamin (Vitamin B-12) 100 mcg tablet 1 tablet, Daily    FLUoxetine (PROZAC) 20 mg, oral, Daily    irbesartan (AVAPRO) 150 mg, oral, Daily    metoprolol succinate XL (TOPROL-XL) 25 mg, oral, Daily    multivitamin with minerals (multivitamin-iron-folic acid) tablet 1 tablet, Daily    oxyBUTYnin (DITROPAN) 5 mg, oral, 2 times daily       Physical Exam:  Physical Exam  HENT:      Head: Normocephalic.      Nose: Nose normal.      Mouth/Throat:      Mouth: Mucous membranes are moist.   Cardiovascular:      Rate and Rhythm: Normal rate and regular rhythm.      Heart sounds: S1 normal and S2 normal. No murmur heard.  Pulmonary:      Effort: Pulmonary effort is normal.      Breath sounds: Normal breath sounds.   Abdominal:       Palpations: Abdomen is soft.   Musculoskeletal:         General: Normal range of motion.      Cervical back: Normal range of motion.   Skin:     General: Skin is warm and dry.   Neurological:      General: No focal deficit present.      Mental Status: She is alert.   Psychiatric:         Mood and Affect: Mood normal.        Last Labs:  CBC -  Lab Results   Component Value Date    WBC 4.9 10/29/2024    HGB 13.6 10/29/2024    HCT 41.6 10/29/2024    MCV 92 10/29/2024     10/29/2024       CMP -  Lab Results   Component Value Date    CALCIUM 9.1 10/29/2024    PHOS 3.5 07/19/2024    PROT 6.4 10/29/2024    ALBUMIN 4.2 10/29/2024    AST 20 10/29/2024    ALT 22 10/29/2024    ALKPHOS 58 10/29/2024    BILITOT 0.8 10/29/2024       LIPID PANEL -   Lab Results   Component Value Date    CHOL 142 10/29/2024    TRIG 142 10/29/2024    HDL 63.9 10/29/2024    CHHDL 2.2 10/29/2024    LDLF 119 (H) 11/29/2022    VLDL 28 10/29/2024    NHDL 78 10/29/2024       RENAL FUNCTION PANEL -   Lab Results   Component Value Date    GLUCOSE 108 (H) 10/29/2024     10/29/2024    K 4.4 10/29/2024     10/29/2024    CO2 29 10/29/2024    ANIONGAP 9 (L) 10/29/2024    BUN 15 10/29/2024    CREATININE 0.77 10/29/2024    CALCIUM 9.1 10/29/2024    PHOS 3.5 07/19/2024    ALBUMIN 4.2 10/29/2024        Lab Results   Component Value Date     (H) 07/17/2024    HGBA1C 5.9 (H) 10/29/2024     Assessment/Plan   CAD: mild CAD by CT calcium score in 2013.   Patient with frequent PVCs (15% burden) on recent Holter (placed by Neurology), denies palpitations.   She denies any angina and remains physically active.  Last FLP 10/24 LDL 50 - at goal.  -continue ASA, clopidogrel (stroke), atorvastatin 80 mg daily, metoprolol succinate 25 mg daily   -should have rpt FLP, AST/ALT later this year    PVCs/pSVT  Holter 7/2024: 16 SVT runs occurred, the run with the fastest interval lasting 4 beats with a max rate of 194 bpm, the longest lasting 5 beats with an  average rate of 103 bpm.  Isolated SVE's were occasional, SVE couplets were rare and SVE triplets were rare.  Isolated VE's were frequent with a 15.6% burden, VE couplets were rare and VE triplets were rare.  Ventricular bigeminy and trigeminy were present.  Today, denies palpitations.  -continue on metoprolol XL 25 mg daily     MV prolapse:   Denies HF sx.   TTE 7/24 with mild MR  -serial monitoring clinically  -will repeat an echo if she has symptoms    H/o stroke s/p TPA  No Afib on monitor  Today, reports no residual symptoms from stroke  -continue on clopidogrel, statin    HTN:   Today's /82  -continue amlodipine 5mg daily  -irbesartan 150mg daily  -metoprolol succinate 25mg daily  -BP goal <140/high 80s-90s  -instructed to bring BP log and cuff next visit in 6 mos with EUGENIA    Scribe Attestation  By signing my name below, IKiera, Scribe   attest that this documentation has been prepared under the direction and in the presence of Harika Centeno MD.

## 2025-06-12 ENCOUNTER — OFFICE VISIT (OUTPATIENT)
Dept: CARDIOLOGY | Facility: HOSPITAL | Age: 85
End: 2025-06-12
Payer: MEDICARE

## 2025-06-12 VITALS
HEIGHT: 66 IN | WEIGHT: 152.6 LBS | DIASTOLIC BLOOD PRESSURE: 82 MMHG | HEART RATE: 60 BPM | SYSTOLIC BLOOD PRESSURE: 136 MMHG | BODY MASS INDEX: 24.53 KG/M2

## 2025-06-12 DIAGNOSIS — I10 PRIMARY HYPERTENSION: ICD-10-CM

## 2025-06-12 DIAGNOSIS — I49.9 IRREGULAR HEART BEAT: ICD-10-CM

## 2025-06-12 DIAGNOSIS — I25.10 CORONARY ARTERY DISEASE INVOLVING NATIVE CORONARY ARTERY OF NATIVE HEART, UNSPECIFIED WHETHER ANGINA PRESENT: ICD-10-CM

## 2025-06-12 DIAGNOSIS — I63.9 CEREBROVASCULAR ACCIDENT (CVA), UNSPECIFIED MECHANISM (MULTI): ICD-10-CM

## 2025-06-12 DIAGNOSIS — I50.9 CONGESTIVE HEART FAILURE, UNSPECIFIED HF CHRONICITY, UNSPECIFIED HEART FAILURE TYPE: ICD-10-CM

## 2025-06-12 LAB
ATRIAL RATE: 60 BPM
P AXIS: 59 DEGREES
P OFFSET: 172 MS
P ONSET: 122 MS
PR INTERVAL: 200 MS
Q ONSET: 222 MS
QRS COUNT: 10 BEATS
QRS DURATION: 78 MS
QT INTERVAL: 414 MS
QTC CALCULATION(BAZETT): 414 MS
QTC FREDERICIA: 414 MS
R AXIS: 56 DEGREES
T AXIS: 57 DEGREES
T OFFSET: 429 MS
VENTRICULAR RATE: 60 BPM

## 2025-06-12 PROCEDURE — 99214 OFFICE O/P EST MOD 30 MIN: CPT | Performed by: INTERNAL MEDICINE

## 2025-06-12 PROCEDURE — 93005 ELECTROCARDIOGRAM TRACING: CPT | Performed by: INTERNAL MEDICINE

## 2025-06-12 PROCEDURE — 1159F MED LIST DOCD IN RCRD: CPT | Performed by: INTERNAL MEDICINE

## 2025-06-12 PROCEDURE — 3075F SYST BP GE 130 - 139MM HG: CPT | Performed by: INTERNAL MEDICINE

## 2025-06-12 PROCEDURE — 3079F DIAST BP 80-89 MM HG: CPT | Performed by: INTERNAL MEDICINE

## 2025-06-12 PROCEDURE — 1036F TOBACCO NON-USER: CPT | Performed by: INTERNAL MEDICINE

## 2025-06-12 PROCEDURE — 1157F ADVNC CARE PLAN IN RCRD: CPT | Performed by: INTERNAL MEDICINE

## 2025-06-12 PROCEDURE — 99213 OFFICE O/P EST LOW 20 MIN: CPT | Performed by: INTERNAL MEDICINE

## 2025-06-12 RX ORDER — ATORVASTATIN CALCIUM 80 MG/1
80 TABLET, FILM COATED ORAL NIGHTLY
Qty: 90 TABLET | Refills: 3 | Status: SHIPPED | OUTPATIENT
Start: 2025-06-12 | End: 2026-06-12

## 2025-06-12 RX ORDER — IRBESARTAN 150 MG/1
150 TABLET ORAL DAILY
Qty: 90 TABLET | Refills: 3 | Status: SHIPPED | OUTPATIENT
Start: 2025-06-12 | End: 2026-06-12

## 2025-06-12 RX ORDER — METOPROLOL SUCCINATE 25 MG/1
25 TABLET, EXTENDED RELEASE ORAL DAILY
Qty: 90 TABLET | Refills: 3 | Status: SHIPPED | OUTPATIENT
Start: 2025-06-12 | End: 2026-06-12

## 2025-06-12 RX ORDER — AMLODIPINE BESYLATE 5 MG/1
5 TABLET ORAL DAILY
Qty: 90 TABLET | Refills: 3 | Status: SHIPPED | OUTPATIENT
Start: 2025-06-12 | End: 2026-06-12

## 2025-06-12 NOTE — PATIENT INSTRUCTIONS
Thanks for following up in office today.    1)  Please check your blood pressure and heart rate twice daily for the 1-2 weeks prior to your next visit. Please bring your home BP cuff to your next visit with us. Your blood pressure goal is under 140s over high 80's-90's. Please call if your blood pressure is elevated consistently.     2)  Please continue your cardiac medications as prescribed. I refilled your medications today.    Follow up with Aubrie Garcia in 6 months  If you have any questions, please call us at [phone]

## 2025-09-04 ENCOUNTER — OFFICE VISIT (OUTPATIENT)
Dept: PRIMARY CARE | Facility: CLINIC | Age: 85
End: 2025-09-04
Payer: MEDICARE

## 2025-09-04 VITALS
TEMPERATURE: 96.8 F | WEIGHT: 149 LBS | RESPIRATION RATE: 20 BRPM | HEART RATE: 78 BPM | BODY MASS INDEX: 23.95 KG/M2 | OXYGEN SATURATION: 99 % | SYSTOLIC BLOOD PRESSURE: 136 MMHG | HEIGHT: 66 IN | DIASTOLIC BLOOD PRESSURE: 84 MMHG

## 2025-09-04 DIAGNOSIS — R73.03 PREDIABETES: ICD-10-CM

## 2025-09-04 DIAGNOSIS — H91.93 DECREASED HEARING, BILATERAL: ICD-10-CM

## 2025-09-04 DIAGNOSIS — H61.22 IMPACTED CERUMEN OF LEFT EAR: ICD-10-CM

## 2025-09-04 DIAGNOSIS — H92.02 LEFT EAR PAIN: Primary | ICD-10-CM

## 2025-09-04 DIAGNOSIS — I10 PRIMARY HYPERTENSION: ICD-10-CM

## 2025-09-04 DIAGNOSIS — E78.00 HYPERCHOLESTEROLEMIA: ICD-10-CM

## 2025-09-04 PROCEDURE — 3079F DIAST BP 80-89 MM HG: CPT | Performed by: FAMILY MEDICINE

## 2025-09-04 PROCEDURE — 1126F AMNT PAIN NOTED NONE PRSNT: CPT | Performed by: FAMILY MEDICINE

## 2025-09-04 PROCEDURE — 99212 OFFICE O/P EST SF 10 MIN: CPT | Performed by: FAMILY MEDICINE

## 2025-09-04 PROCEDURE — 1160F RVW MEDS BY RX/DR IN RCRD: CPT | Performed by: FAMILY MEDICINE

## 2025-09-04 PROCEDURE — 3075F SYST BP GE 130 - 139MM HG: CPT | Performed by: FAMILY MEDICINE

## 2025-09-04 PROCEDURE — 1159F MED LIST DOCD IN RCRD: CPT | Performed by: FAMILY MEDICINE

## 2025-09-04 PROCEDURE — 1036F TOBACCO NON-USER: CPT | Performed by: FAMILY MEDICINE

## 2025-09-04 SDOH — ECONOMIC STABILITY: FOOD INSECURITY: WITHIN THE PAST 12 MONTHS, THE FOOD YOU BOUGHT JUST DIDN'T LAST AND YOU DIDN'T HAVE MONEY TO GET MORE.: NEVER TRUE

## 2025-09-04 SDOH — ECONOMIC STABILITY: FOOD INSECURITY: WITHIN THE PAST 12 MONTHS, YOU WORRIED THAT YOUR FOOD WOULD RUN OUT BEFORE YOU GOT MONEY TO BUY MORE.: NEVER TRUE

## 2025-09-04 ASSESSMENT — PATIENT HEALTH QUESTIONNAIRE - PHQ9
2. FEELING DOWN, DEPRESSED OR HOPELESS: NOT AT ALL
SUM OF ALL RESPONSES TO PHQ9 QUESTIONS 1 & 2: 0
1. LITTLE INTEREST OR PLEASURE IN DOING THINGS: NOT AT ALL

## 2025-09-04 ASSESSMENT — ENCOUNTER SYMPTOMS
LOSS OF SENSATION IN FEET: 0
DEPRESSION: 0
OCCASIONAL FEELINGS OF UNSTEADINESS: 0

## 2025-09-04 ASSESSMENT — PAIN SCALES - GENERAL: PAINLEVEL_OUTOF10: 0-NO PAIN

## 2025-09-04 ASSESSMENT — LIFESTYLE VARIABLES
HOW MANY STANDARD DRINKS CONTAINING ALCOHOL DO YOU HAVE ON A TYPICAL DAY: 1 OR 2
AUDIT-C TOTAL SCORE: 1
SKIP TO QUESTIONS 9-10: 1
HOW OFTEN DO YOU HAVE SIX OR MORE DRINKS ON ONE OCCASION: NEVER
HOW OFTEN DO YOU HAVE A DRINK CONTAINING ALCOHOL: MONTHLY OR LESS

## 2025-10-30 ENCOUNTER — APPOINTMENT (OUTPATIENT)
Dept: PRIMARY CARE | Facility: CLINIC | Age: 85
End: 2025-10-30
Payer: MEDICARE